# Patient Record
Sex: MALE | Race: WHITE | NOT HISPANIC OR LATINO | Employment: FULL TIME | URBAN - METROPOLITAN AREA
[De-identification: names, ages, dates, MRNs, and addresses within clinical notes are randomized per-mention and may not be internally consistent; named-entity substitution may affect disease eponyms.]

---

## 2023-02-15 PROBLEM — R97.20 ELEVATED PSA: Status: ACTIVE | Noted: 2023-02-15

## 2023-02-15 PROBLEM — I10 BENIGN ESSENTIAL HYPERTENSION: Status: ACTIVE | Noted: 2023-02-15

## 2023-02-15 PROBLEM — E78.5 HYPERLIPIDEMIA: Status: ACTIVE | Noted: 2023-02-15

## 2023-02-15 PROBLEM — R09.82 POST-NASAL DRIP: Status: ACTIVE | Noted: 2023-02-15

## 2023-02-15 PROBLEM — K21.9 GERD (GASTROESOPHAGEAL REFLUX DISEASE): Status: ACTIVE | Noted: 2023-02-15

## 2023-02-15 PROBLEM — I86.1 BILATERAL VARICOCELES: Status: ACTIVE | Noted: 2023-02-15

## 2023-02-15 PROBLEM — L23.9 CONTACT DERMATITIS, ALLERGIC: Status: ACTIVE | Noted: 2023-02-15

## 2023-02-15 PROBLEM — M25.50 POLYARTHRALGIA: Status: ACTIVE | Noted: 2023-02-15

## 2023-02-15 PROBLEM — E05.90 HYPERTHYROIDISM, SUBCLINICAL: Status: ACTIVE | Noted: 2023-02-15

## 2023-02-15 PROBLEM — E66.3 OVERWEIGHT WITH BODY MASS INDEX (BMI) OF 27 TO 27.9 IN ADULT: Status: ACTIVE | Noted: 2023-02-15

## 2023-02-15 PROBLEM — F41.9 MILD ANXIETY: Status: ACTIVE | Noted: 2023-02-15

## 2023-02-15 PROBLEM — R79.89 LOW VITAMIN D LEVEL: Status: ACTIVE | Noted: 2023-02-15

## 2023-02-15 PROBLEM — M79.89 RIGHT LEG SWELLING: Status: ACTIVE | Noted: 2023-02-15

## 2023-02-15 PROBLEM — N41.9 PROSTATITIS: Status: ACTIVE | Noted: 2023-02-15

## 2023-02-15 RX ORDER — BETAMETHASONE VALERATE 1.2 MG/G
OINTMENT TOPICAL 2 TIMES DAILY
COMMUNITY
Start: 2018-04-03 | End: 2024-03-26 | Stop reason: SDUPTHER

## 2023-02-15 RX ORDER — DOXYCYCLINE 100 MG/1
100 TABLET ORAL EVERY 12 HOURS
COMMUNITY
End: 2024-04-29 | Stop reason: WASHOUT

## 2023-02-15 RX ORDER — AZELASTINE 1 MG/ML
2 SPRAY, METERED NASAL EVERY EVENING
COMMUNITY
End: 2024-04-29 | Stop reason: WASHOUT

## 2023-02-15 RX ORDER — VERAPAMIL HYDROCHLORIDE 120 MG/1
120 CAPSULE, EXTENDED RELEASE ORAL DAILY
COMMUNITY
Start: 2019-03-20 | End: 2023-06-20 | Stop reason: SDUPTHER

## 2023-02-15 RX ORDER — ASPIRIN 81 MG/1
1 TABLET ORAL DAILY
COMMUNITY
End: 2023-09-11 | Stop reason: SDUPTHER

## 2023-02-15 RX ORDER — DIAZEPAM 10 MG/1
10 TABLET ORAL
COMMUNITY
End: 2024-04-29 | Stop reason: WASHOUT

## 2023-02-15 RX ORDER — LISINOPRIL AND HYDROCHLOROTHIAZIDE 12.5; 2 MG/1; MG/1
1 TABLET ORAL 2 TIMES DAILY
COMMUNITY
Start: 2018-11-01 | End: 2023-08-07

## 2023-02-15 RX ORDER — OMEPRAZOLE 40 MG/1
1 CAPSULE, DELAYED RELEASE ORAL DAILY
COMMUNITY
Start: 2014-02-17 | End: 2023-09-20 | Stop reason: SDUPTHER

## 2023-02-15 RX ORDER — CHOLECALCIFEROL (VITAMIN D3) 25 MCG
TABLET ORAL
COMMUNITY
End: 2024-04-29 | Stop reason: WASHOUT

## 2023-03-28 ENCOUNTER — OFFICE VISIT (OUTPATIENT)
Dept: PRIMARY CARE | Facility: CLINIC | Age: 63
End: 2023-03-28
Payer: COMMERCIAL

## 2023-03-28 VITALS
DIASTOLIC BLOOD PRESSURE: 74 MMHG | BODY MASS INDEX: 27.62 KG/M2 | HEART RATE: 67 BPM | WEIGHT: 176 LBS | OXYGEN SATURATION: 95 % | HEIGHT: 67 IN | SYSTOLIC BLOOD PRESSURE: 128 MMHG

## 2023-03-28 DIAGNOSIS — R79.89 LOW VITAMIN D LEVEL: ICD-10-CM

## 2023-03-28 DIAGNOSIS — R10.30 INGUINAL PAIN, UNSPECIFIED LATERALITY: Primary | ICD-10-CM

## 2023-03-28 DIAGNOSIS — I10 BENIGN ESSENTIAL HYPERTENSION: ICD-10-CM

## 2023-03-28 DIAGNOSIS — Z00.00 PREVENTATIVE HEALTH CARE: ICD-10-CM

## 2023-03-28 LAB
POC APPEARANCE, URINE: CLEAR
POC BILIRUBIN, URINE: NEGATIVE
POC BLOOD, URINE: NEGATIVE
POC COLOR, URINE: YELLOW
POC GLUCOSE, URINE: NEGATIVE MG/DL
POC KETONES, URINE: NEGATIVE MG/DL
POC LEUKOCYTES, URINE: NEGATIVE
POC NITRITE,URINE: NEGATIVE
POC PH, URINE: 7 PH
POC PROTEIN, URINE: NEGATIVE MG/DL
POC SPECIFIC GRAVITY, URINE: 1.02
POC UROBILINOGEN, URINE: 0.2 EU/DL

## 2023-03-28 PROCEDURE — 81003 URINALYSIS AUTO W/O SCOPE: CPT | Performed by: FAMILY MEDICINE

## 2023-03-28 PROCEDURE — 3078F DIAST BP <80 MM HG: CPT | Performed by: FAMILY MEDICINE

## 2023-03-28 PROCEDURE — 3074F SYST BP LT 130 MM HG: CPT | Performed by: FAMILY MEDICINE

## 2023-03-28 PROCEDURE — 99214 OFFICE O/P EST MOD 30 MIN: CPT | Performed by: FAMILY MEDICINE

## 2023-03-28 PROCEDURE — 1036F TOBACCO NON-USER: CPT | Performed by: FAMILY MEDICINE

## 2023-03-28 ASSESSMENT — PATIENT HEALTH QUESTIONNAIRE - PHQ9
SUM OF ALL RESPONSES TO PHQ9 QUESTIONS 1 AND 2: 2
2. FEELING DOWN, DEPRESSED OR HOPELESS: SEVERAL DAYS
10. IF YOU CHECKED OFF ANY PROBLEMS, HOW DIFFICULT HAVE THESE PROBLEMS MADE IT FOR YOU TO DO YOUR WORK, TAKE CARE OF THINGS AT HOME, OR GET ALONG WITH OTHER PEOPLE: NOT DIFFICULT AT ALL
1. LITTLE INTEREST OR PLEASURE IN DOING THINGS: SEVERAL DAYS

## 2023-03-28 NOTE — PROGRESS NOTES
"Subjective   Patient ID: Foreign White is a 63 y.o. male who presents for Follow-up (6 month follow up, would like to discuss results on prostate ).    HPI     #) getting work up for hyperthyroidism   - elevated BP -- much better today   - work up with Dr Ham     #) leg swelling - doing pretty good - got better for now     #) checking on spot on the eye on the eye ball, described as a blood spot on the whites of the eye -- RESOLVED with drops   - BP today was 133/80 --> 159/85-->  128/74 today   - no eye pain   - no change in vision  - was seen by eye doc and given eye drop   - family eye care in [place],     #) BPH with a varicocele - stable- q 6 month PSA and MRI yearly.   - Follow with Dr White, urology -- updated MRI, to be reviewed 4/5/23  - had a biopsy- PSA stable and MRI shows no evidence of cancer. We will repeat PSA every 6 months and MRI annually.  - some urinary changes     #) allergies - stable   - flonase as needed.     #) HTN with HLD - stable , controlled   - on lisinorpil- HCTZ and verapamil     #) Anxiety- COVID has stressed him out- still mostly work related   - feels like he is doing pretty good at the moment   - sleep is reported as ok  was stuck on a ride at [place] - harness was too ticht and had a panic attack   MRI phobia prn alprazolam with imaging     #) GERD - on PPI - STABLE   - acid seems to make it work   - EGD and cscope 2/15/19- repeat cscope every 5 years     #) Rash- topical irritants from work   - follows with [facility]   - Mohs placed for leg lesion     Review of Systems    Objective   /74   Pulse 67   Ht 1.702 m (5' 7\")   Wt 79.8 kg (176 lb)   SpO2 95%   BMI 27.57 kg/m²     Physical Exam  Vitals reviewed.   Constitutional:       General: He is not in acute distress.     Appearance: He is not toxic-appearing.   HENT:      Head: Normocephalic and atraumatic.      Right Ear: Tympanic membrane and ear canal normal. There is no impacted cerumen.      Left " Ear: Tympanic membrane and ear canal normal. There is no impacted cerumen.      Nose: No congestion or rhinorrhea.      Mouth/Throat:      Mouth: Mucous membranes are moist.      Pharynx: No oropharyngeal exudate or posterior oropharyngeal erythema.   Eyes:      Extraocular Movements: Extraocular movements intact.      Conjunctiva/sclera: Conjunctivae normal.      Pupils: Pupils are equal, round, and reactive to light.   Cardiovascular:      Rate and Rhythm: Normal rate and regular rhythm.      Heart sounds: Normal heart sounds. No murmur heard.  Pulmonary:      Effort: No respiratory distress.      Breath sounds: No wheezing.   Abdominal:      General: Bowel sounds are normal.      Palpations: Abdomen is soft.      Tenderness: There is no abdominal tenderness.   Musculoskeletal:         General: No deformity. Normal range of motion.      Cervical back: Neck supple. No tenderness.      Right lower leg: No edema.      Left lower leg: No edema.   Lymphadenopathy:      Cervical: No cervical adenopathy.   Skin:     Findings: No bruising or rash.   Neurological:      Mental Status: He is alert.      Cranial Nerves: No cranial nerve deficit.      Motor: No weakness.      Gait: Gait normal.   Psychiatric:         Mood and Affect: Mood normal.         Assessment/Plan   Problem List Items Addressed This Visit          Circulatory    Benign essential hypertension       Other    Low vitamin D level    Relevant Orders    Vitamin D 25-Hydroxy,Total     Other Visit Diagnoses       Inguinal pain, unspecified laterality    -  Primary    Relevant Orders    POCT UA Automated manually resulted    Preventative health care        Relevant Orders    CBC and Auto Differential    Comprehensive metabolic panel    Lipid panel          Please continue to monitor your Blood pressure at home.  Looks great today.     EGD and cscope 2/15/19- repeat cscope every 5 years (February 2024)     continue yearly follow up with Dr Owusu for prostate  monitoring    stress test looked good in January 2019.     Follow up with Dr Ham for the results of the thyroid testing.    Please also continue follow up with [facility] yearly.     Please get updated fasting blood work in June 2023, orders given.     We will check urine today for infection.     Follow up in 6 months for a Physical Examination  or sooner as needed.

## 2023-03-28 NOTE — PATIENT INSTRUCTIONS
Please continue to monitor your Blood pressure at home.  Looks great today.     EGD and cscope 2/15/19- repeat cscope every 5 years (February 2024)     continue yearly follow up with Dr Owusu for prostate monitoring    stress test looked good in January 2019.     Follow up with Dr Ham for the results of the thyroid testing.    Please also continue follow up with [facility] yearly.     Please get updated fasting blood work in June 2023, orders given.     We will check urine today for infection.     Follow up in 6 months for a Physical Examination  or sooner as needed.

## 2023-04-12 LAB
RBC, URINE: <1 /HPF (ref 0–5)
WBC, URINE: 1 /HPF (ref 0–5)

## 2023-04-13 LAB — URINE CULTURE: NO GROWTH

## 2023-04-14 LAB
CHLAMYDIA TRACH., AMPLIFIED: NEGATIVE
N. GONORRHEA, AMPLIFIED: NEGATIVE

## 2023-04-18 LAB — UREAPLASMA/MYCOPLASMA CULTURE: NORMAL

## 2023-04-20 ENCOUNTER — LAB (OUTPATIENT)
Dept: LAB | Facility: LAB | Age: 63
End: 2023-04-20
Payer: COMMERCIAL

## 2023-04-20 DIAGNOSIS — Z00.00 PREVENTATIVE HEALTH CARE: ICD-10-CM

## 2023-04-20 DIAGNOSIS — R79.89 LOW VITAMIN D LEVEL: ICD-10-CM

## 2023-04-20 LAB
ALANINE AMINOTRANSFERASE (SGPT) (U/L) IN SER/PLAS: 15 U/L (ref 10–52)
ALBUMIN (G/DL) IN SER/PLAS: 4.3 G/DL (ref 3.4–5)
ALKALINE PHOSPHATASE (U/L) IN SER/PLAS: 33 U/L (ref 33–136)
ANION GAP IN SER/PLAS: 11 MMOL/L (ref 10–20)
ASPARTATE AMINOTRANSFERASE (SGOT) (U/L) IN SER/PLAS: 17 U/L (ref 9–39)
BASOPHILS (10*3/UL) IN BLOOD BY AUTOMATED COUNT: 0.07 X10E9/L (ref 0–0.1)
BASOPHILS/100 LEUKOCYTES IN BLOOD BY AUTOMATED COUNT: 1 % (ref 0–2)
BILIRUBIN TOTAL (MG/DL) IN SER/PLAS: 0.9 MG/DL (ref 0–1.2)
CALCIDIOL (25 OH VITAMIN D3) (NG/ML) IN SER/PLAS: 78 NG/ML
CALCIUM (MG/DL) IN SER/PLAS: 9 MG/DL (ref 8.6–10.3)
CARBON DIOXIDE, TOTAL (MMOL/L) IN SER/PLAS: 31 MMOL/L (ref 21–32)
CHLORIDE (MMOL/L) IN SER/PLAS: 103 MMOL/L (ref 98–107)
CHOLESTEROL (MG/DL) IN SER/PLAS: 149 MG/DL (ref 0–199)
CHOLESTEROL IN HDL (MG/DL) IN SER/PLAS: 40.2 MG/DL
CHOLESTEROL/HDL RATIO: 3.7
CREATININE (MG/DL) IN SER/PLAS: 0.99 MG/DL (ref 0.5–1.3)
EOSINOPHILS (10*3/UL) IN BLOOD BY AUTOMATED COUNT: 0.17 X10E9/L (ref 0–0.7)
EOSINOPHILS/100 LEUKOCYTES IN BLOOD BY AUTOMATED COUNT: 2.4 % (ref 0–6)
ERYTHROCYTE DISTRIBUTION WIDTH (RATIO) BY AUTOMATED COUNT: 13.2 % (ref 11.5–14.5)
ERYTHROCYTE MEAN CORPUSCULAR HEMOGLOBIN CONCENTRATION (G/DL) BY AUTOMATED: 32.5 G/DL (ref 32–36)
ERYTHROCYTE MEAN CORPUSCULAR VOLUME (FL) BY AUTOMATED COUNT: 89 FL (ref 80–100)
ERYTHROCYTES (10*6/UL) IN BLOOD BY AUTOMATED COUNT: 5.34 X10E12/L (ref 4.5–5.9)
GFR MALE: 85 ML/MIN/1.73M2
GLUCOSE (MG/DL) IN SER/PLAS: 94 MG/DL (ref 74–99)
HEMATOCRIT (%) IN BLOOD BY AUTOMATED COUNT: 47.4 % (ref 41–52)
HEMOGLOBIN (G/DL) IN BLOOD: 15.4 G/DL (ref 13.5–17.5)
IMMATURE GRANULOCYTES/100 LEUKOCYTES IN BLOOD BY AUTOMATED COUNT: 0.1 % (ref 0–0.9)
LDL: 87 MG/DL (ref 0–99)
LEUKOCYTES (10*3/UL) IN BLOOD BY AUTOMATED COUNT: 7 X10E9/L (ref 4.4–11.3)
LYMPHOCYTES (10*3/UL) IN BLOOD BY AUTOMATED COUNT: 3.17 X10E9/L (ref 1.2–4.8)
LYMPHOCYTES/100 LEUKOCYTES IN BLOOD BY AUTOMATED COUNT: 45.3 % (ref 13–44)
MONOCYTES (10*3/UL) IN BLOOD BY AUTOMATED COUNT: 0.86 X10E9/L (ref 0.1–1)
MONOCYTES/100 LEUKOCYTES IN BLOOD BY AUTOMATED COUNT: 12.3 % (ref 2–10)
NEUTROPHILS (10*3/UL) IN BLOOD BY AUTOMATED COUNT: 2.72 X10E9/L (ref 1.2–7.7)
NEUTROPHILS/100 LEUKOCYTES IN BLOOD BY AUTOMATED COUNT: 38.9 % (ref 40–80)
PLATELETS (10*3/UL) IN BLOOD AUTOMATED COUNT: 314 X10E9/L (ref 150–450)
POTASSIUM (MMOL/L) IN SER/PLAS: 4.3 MMOL/L (ref 3.5–5.3)
PROTEIN TOTAL: 6.2 G/DL (ref 6.4–8.2)
SODIUM (MMOL/L) IN SER/PLAS: 141 MMOL/L (ref 136–145)
THYROTROPIN (MIU/L) IN SER/PLAS BY DETECTION LIMIT <= 0.05 MIU/L: 0.29 MIU/L (ref 0.44–3.98)
THYROXINE (T4) FREE (NG/DL) IN SER/PLAS: 1.34 NG/DL (ref 0.61–1.12)
TRIGLYCERIDE (MG/DL) IN SER/PLAS: 107 MG/DL (ref 0–149)
UREA NITROGEN (MG/DL) IN SER/PLAS: 19 MG/DL (ref 6–23)
VLDL: 21 MG/DL (ref 0–40)

## 2023-04-20 PROCEDURE — 82306 VITAMIN D 25 HYDROXY: CPT

## 2023-04-20 PROCEDURE — 80061 LIPID PANEL: CPT

## 2023-04-20 PROCEDURE — 85025 COMPLETE CBC W/AUTO DIFF WBC: CPT

## 2023-04-20 PROCEDURE — 80053 COMPREHEN METABOLIC PANEL: CPT

## 2023-04-20 PROCEDURE — 36415 COLL VENOUS BLD VENIPUNCTURE: CPT

## 2023-05-04 ENCOUNTER — APPOINTMENT (OUTPATIENT)
Dept: LAB | Facility: LAB | Age: 63
End: 2023-05-04
Payer: COMMERCIAL

## 2023-05-04 LAB
ANION GAP IN SER/PLAS: 10 MMOL/L (ref 10–20)
CALCIUM (MG/DL) IN SER/PLAS: 9.3 MG/DL (ref 8.6–10.3)
CARBON DIOXIDE, TOTAL (MMOL/L) IN SER/PLAS: 30 MMOL/L (ref 21–32)
CHLORIDE (MMOL/L) IN SER/PLAS: 103 MMOL/L (ref 98–107)
CREATININE (MG/DL) IN SER/PLAS: 1.03 MG/DL (ref 0.5–1.3)
ERYTHROCYTE DISTRIBUTION WIDTH (RATIO) BY AUTOMATED COUNT: 13.4 % (ref 11.5–14.5)
ERYTHROCYTE MEAN CORPUSCULAR HEMOGLOBIN CONCENTRATION (G/DL) BY AUTOMATED: 32 G/DL (ref 32–36)
ERYTHROCYTE MEAN CORPUSCULAR VOLUME (FL) BY AUTOMATED COUNT: 90 FL (ref 80–100)
ERYTHROCYTES (10*6/UL) IN BLOOD BY AUTOMATED COUNT: 5.26 X10E12/L (ref 4.5–5.9)
GFR MALE: 81 ML/MIN/1.73M2
GLUCOSE (MG/DL) IN SER/PLAS: 101 MG/DL (ref 74–99)
HEMATOCRIT (%) IN BLOOD BY AUTOMATED COUNT: 47.5 % (ref 41–52)
HEMOGLOBIN (G/DL) IN BLOOD: 15.2 G/DL (ref 13.5–17.5)
INR IN PPP BY COAGULATION ASSAY: 1 (ref 0.9–1.1)
LEUKOCYTES (10*3/UL) IN BLOOD BY AUTOMATED COUNT: 7.6 X10E9/L (ref 4.4–11.3)
PLATELETS (10*3/UL) IN BLOOD AUTOMATED COUNT: 312 X10E9/L (ref 150–450)
POTASSIUM (MMOL/L) IN SER/PLAS: 3.6 MMOL/L (ref 3.5–5.3)
PROTHROMBIN TIME (PT) IN PPP BY COAGULATION ASSAY: 11.5 SEC (ref 9.8–13.4)
SODIUM (MMOL/L) IN SER/PLAS: 139 MMOL/L (ref 136–145)
UREA NITROGEN (MG/DL) IN SER/PLAS: 17 MG/DL (ref 6–23)

## 2023-05-05 LAB — URINE CULTURE: NO GROWTH

## 2023-06-20 ENCOUNTER — TELEPHONE (OUTPATIENT)
Dept: PRIMARY CARE | Facility: CLINIC | Age: 63
End: 2023-06-20
Payer: COMMERCIAL

## 2023-06-20 DIAGNOSIS — I10 BENIGN ESSENTIAL HYPERTENSION: ICD-10-CM

## 2023-06-20 NOTE — TELEPHONE ENCOUNTER
Pt called for a medication refill request for  Verapamil  To go to the [facility] in [place].     LOV-03/28/23 NOV-09/26/23

## 2023-06-21 RX ORDER — VERAPAMIL HYDROCHLORIDE 120 MG/1
120 CAPSULE, EXTENDED RELEASE ORAL DAILY
Qty: 90 CAPSULE | Refills: 3 | Status: SHIPPED | OUTPATIENT
Start: 2023-06-21 | End: 2024-03-28 | Stop reason: SDUPTHER

## 2023-08-06 DIAGNOSIS — I10 ESSENTIAL (PRIMARY) HYPERTENSION: ICD-10-CM

## 2023-08-07 RX ORDER — LISINOPRIL AND HYDROCHLOROTHIAZIDE 12.5; 2 MG/1; MG/1
1 TABLET ORAL 2 TIMES DAILY
Qty: 180 TABLET | Refills: 3 | Status: SHIPPED | OUTPATIENT
Start: 2023-08-07

## 2023-09-11 PROBLEM — C61 PROSTATE CANCER (MULTI): Status: ACTIVE | Noted: 2023-09-11

## 2023-09-11 PROBLEM — G93.31 POST VIRAL SYNDROME: Status: ACTIVE | Noted: 2023-09-11

## 2023-09-11 PROBLEM — H93.13 TINNITUS, BILATERAL: Status: ACTIVE | Noted: 2023-09-11

## 2023-09-11 PROBLEM — R10.2 PERINEAL PAIN IN MALE: Status: ACTIVE | Noted: 2023-09-11

## 2023-09-11 PROBLEM — H90.3 BILATERAL SENSORINEURAL HEARING LOSS: Status: ACTIVE | Noted: 2023-09-11

## 2023-09-11 RX ORDER — LISINOPRIL 20 MG/1
20 TABLET ORAL
COMMUNITY
End: 2023-10-16 | Stop reason: SDUPTHER

## 2023-09-11 RX ORDER — ACETAMINOPHEN, DEXTROMETHORPHAN HBR, DOXYLAMINE SUCCINATE, PHENYLEPHRINE HCL 650; 20; 12.5; 1 MG/30ML; MG/30ML; MG/30ML; MG/30ML
SOLUTION ORAL
COMMUNITY

## 2023-09-11 RX ORDER — ASPIRIN 81 MG/1
1 TABLET ORAL DAILY
COMMUNITY

## 2023-09-11 RX ORDER — METHYLPREDNISOLONE 4 MG/1
TABLET ORAL
COMMUNITY
Start: 2023-01-23 | End: 2024-04-29 | Stop reason: WASHOUT

## 2023-09-20 DIAGNOSIS — K21.9 GASTROESOPHAGEAL REFLUX DISEASE WITHOUT ESOPHAGITIS: ICD-10-CM

## 2023-09-20 RX ORDER — OMEPRAZOLE 40 MG/1
40 CAPSULE, DELAYED RELEASE ORAL
Qty: 90 CAPSULE | Refills: 3 | Status: SHIPPED | OUTPATIENT
Start: 2023-09-20

## 2023-09-23 ASSESSMENT — PROMIS GLOBAL HEALTH SCALE
RATE_QUALITY_OF_LIFE: VERY GOOD
RATE_SOCIAL_SATISFACTION: VERY GOOD
RATE_AVERAGE_PAIN: 3
EMOTIONAL_PROBLEMS: SOMETIMES
RATE_GENERAL_HEALTH: VERY GOOD
RATE_PHYSICAL_HEALTH: VERY GOOD
CARRYOUT_PHYSICAL_ACTIVITIES: COMPLETELY
RATE_AVERAGE_FATIGUE: MILD
RATE_MENTAL_HEALTH: VERY GOOD
CARRYOUT_SOCIAL_ACTIVITIES: VERY GOOD

## 2023-09-26 ENCOUNTER — OFFICE VISIT (OUTPATIENT)
Dept: PRIMARY CARE | Facility: CLINIC | Age: 63
End: 2023-09-26
Payer: COMMERCIAL

## 2023-09-26 VITALS
BODY MASS INDEX: 25.7 KG/M2 | SYSTOLIC BLOOD PRESSURE: 122 MMHG | DIASTOLIC BLOOD PRESSURE: 60 MMHG | OXYGEN SATURATION: 98 % | WEIGHT: 169 LBS | HEART RATE: 72 BPM

## 2023-09-26 DIAGNOSIS — C61 PROSTATE CANCER (MULTI): ICD-10-CM

## 2023-09-26 DIAGNOSIS — E78.2 MIXED HYPERLIPIDEMIA: ICD-10-CM

## 2023-09-26 DIAGNOSIS — I10 BENIGN ESSENTIAL HYPERTENSION: Primary | ICD-10-CM

## 2023-09-26 PROCEDURE — 1036F TOBACCO NON-USER: CPT | Performed by: FAMILY MEDICINE

## 2023-09-26 PROCEDURE — 99396 PREV VISIT EST AGE 40-64: CPT | Performed by: FAMILY MEDICINE

## 2023-09-26 PROCEDURE — 3078F DIAST BP <80 MM HG: CPT | Performed by: FAMILY MEDICINE

## 2023-09-26 PROCEDURE — 3074F SYST BP LT 130 MM HG: CPT | Performed by: FAMILY MEDICINE

## 2023-09-26 ASSESSMENT — PATIENT HEALTH QUESTIONNAIRE - PHQ9
SUM OF ALL RESPONSES TO PHQ9 QUESTIONS 1 AND 2: 0
2. FEELING DOWN, DEPRESSED OR HOPELESS: NOT AT ALL
1. LITTLE INTEREST OR PLEASURE IN DOING THINGS: NOT AT ALL

## 2023-09-26 NOTE — PROGRESS NOTES
Subjective   Patient is a 63 y.o. male presents for yearly physical examination.     #) getting work up for hyperthyroidism    - work up with Dr Ham     #) leg swelling - doing pretty good - got better for now     #) checking on spot on the eye on the eye ball, described as a blood spot on the whites of the eye -- RESOLVED with drops   - family eye care in [place],     #) BPH with a varicocele - stable- q 6 month PSA and MRI yearly.   - Follow with Dr Joshua, urology -- updated MRI, to be reviewed 4/5/23  - had a biopsy- PSA stable and MRI shows no evidence of cancer. We will repeat PSA every 6 months and MRI annually.  - some urinary changes     #) allergies - stable   - flonase as needed.     #) HTN with HLD - stable , controlled   - 122/60 today   - on lisinorpil- HCTZ and verapamil     #) Anxiety- COVID has stressed him out- still mostly work related   - feels like he is doing pretty good at the moment   - sleep is reported as ok  was stuck on a ride at [place] [employer] - harness was too ticht and had a panic attack   MRI phobia prn alprazolam with imaging     #) GERD - on PPI - STABLE   - acid seems to make it work   - EGD and cscope 2/15/19- repeat cscope every 5 years     #) Rash- topical irritants from work   - follows with [facility]   - Mohs placed for leg lesion     Review of system are negative unless otherwise stated in the HPI.     Objective     /60   Pulse 72   Wt 76.7 kg (169 lb)   SpO2 98%   BMI 25.70 kg/m²     Physical Examination  GEN: A+O, no acute distress  HEENT: NC/AT, Oropharynx clear, no exudates, TM visualized, Extraoccular muscles intact, no facial droop; no thyromegaly or cervical LAD  RESP: CTAB, no wheezes   CV: RRR, no murmurs  ABD: soft, non-tender, + BS  SKIN: no rashes or bruising, no peripheral edema   NEURO: CN II-XII grossly intact, moves all extremities equally, no tremor   PSYCH: normal affect, appropriate mood     Assessment/Plan     Blood pressure looks  great today at 122/60.     Call if you need any medication refills.     EGD and cscope 2/15/19- repeat cscope every 5 years (February 2024) - please reach out to your gastroenterologist to schedule this.     Continue yearly follow up with Dr Jarvis for the prostate monitoring     Continue the daily exercise program.      stress test looked good in January 2019.     Follow up with Dr Ham for the results of the thyroid testing and monitoring.     Please also continue follow up with [facility] yearly as recommended     You are due for repeat fasting blood work in April 2024. Orders given today (continue labs testing for your urologist and endocrinologist).    Follow up in 6 months for a Welcome to Medicate Examination  or sooner as needed.

## 2023-09-26 NOTE — PATIENT INSTRUCTIONS
Blood pressure looks great today at 122/60.     Call if you need any medication refills.     EGD and cscope 2/15/19- repeat cscope every 5 years (February 2024) - please reach out to your gastroenterologist to schedule this.     Continue yearly follow up with Dr Jarvis for the prostate monitoring     Continue the daily exercise program.      stress test looked good in January 2019.     Follow up with Dr Ham for the results of the thyroid testing and monitoring.     Please also continue follow up with [facility] yearly as recommended     You are due for repeat fasting blood work in April 2024. Orders given today (continue labs testing for your urologist and endocrinologist).    Follow up in 6 months for a Welcome to Medicate Examination  or sooner as needed.

## 2023-10-16 ENCOUNTER — LAB (OUTPATIENT)
Dept: LAB | Facility: LAB | Age: 63
End: 2023-10-16
Payer: COMMERCIAL

## 2023-10-16 ENCOUNTER — OFFICE VISIT (OUTPATIENT)
Dept: ENDOCRINOLOGY | Facility: CLINIC | Age: 63
End: 2023-10-16
Payer: COMMERCIAL

## 2023-10-16 VITALS
DIASTOLIC BLOOD PRESSURE: 81 MMHG | HEIGHT: 68 IN | BODY MASS INDEX: 25.31 KG/M2 | WEIGHT: 167 LBS | SYSTOLIC BLOOD PRESSURE: 155 MMHG

## 2023-10-16 DIAGNOSIS — E05.90 HYPERTHYROIDISM, SUBCLINICAL: Primary | ICD-10-CM

## 2023-10-16 DIAGNOSIS — E05.90 HYPERTHYROIDISM, SUBCLINICAL: ICD-10-CM

## 2023-10-16 LAB
T4 FREE SERPL-MCNC: 1.11 NG/DL (ref 0.61–1.12)
TSH SERPL-ACNC: 0.22 MIU/L (ref 0.44–3.98)

## 2023-10-16 PROCEDURE — 36415 COLL VENOUS BLD VENIPUNCTURE: CPT

## 2023-10-16 PROCEDURE — 84443 ASSAY THYROID STIM HORMONE: CPT

## 2023-10-16 PROCEDURE — 1036F TOBACCO NON-USER: CPT | Performed by: INTERNAL MEDICINE

## 2023-10-16 PROCEDURE — 3079F DIAST BP 80-89 MM HG: CPT | Performed by: INTERNAL MEDICINE

## 2023-10-16 PROCEDURE — 3077F SYST BP >= 140 MM HG: CPT | Performed by: INTERNAL MEDICINE

## 2023-10-16 PROCEDURE — 84439 ASSAY OF FREE THYROXINE: CPT

## 2023-10-16 PROCEDURE — 99213 OFFICE O/P EST LOW 20 MIN: CPT | Performed by: INTERNAL MEDICINE

## 2023-10-16 NOTE — LETTER
"October 16, 2023     Umu Sroensen DO  [address]  [address]  [facility] [address]    Patient: Foreign White   YOB: 1960   Date of Visit: 10/16/2023       Dear Dr. Umu Sorensen DO:    Thank you for referring Foreign White to me for evaluation. Below are my notes for this consultation.  If you have questions, please do not hesitate to call me. I look forward to following your patient along with you.       Sincerely,     Vladimir Ham MD      CC: No Recipients  ______________________________________________________________________________________    Subjective  Foreign White is a 63 y.o. male with subclinical thyrotoxicosis    No palpitations or tremors  Some intentional weight loss, walking    Objective  /81 (BP Location: Left arm, Patient Position: Sitting)   Ht 1.727 m (5' 8\")   Wt 75.8 kg (167 lb)   BMI 25.39 kg/m²   Physical Exam  Constitutional:       General: He is not in acute distress.     Appearance: He is normal weight.   Neurological:      Mental Status: He is alert.          Lab Results   Component Value Date    TSH 0.29 (L) 04/20/2023    FREET4 1.34 (H) 04/20/2023       Assessment/Plan    SUBCLINICAL THYROTOXICOSIS  History of mild TSH suppression  No thyrotoxic symptoms      RECOMMENDATIONS  Draw TSH, free T4   Results on [employer]  Message or call if you have not received the results 3 days later.      "

## 2023-10-16 NOTE — PROGRESS NOTES
"Subjective   Foreign CHILDERS Carrizo Hill is a 63 y.o. male with subclinical thyrotoxicosis    No palpitations or tremors  Some intentional weight loss, walking    Objective   /81 (BP Location: Left arm, Patient Position: Sitting)   Ht 1.727 m (5' 8\")   Wt 75.8 kg (167 lb)   BMI 25.39 kg/m²   Physical Exam  Constitutional:       General: He is not in acute distress.     Appearance: He is normal weight.   Neurological:      Mental Status: He is alert.          Lab Results   Component Value Date    TSH 0.29 (L) 04/20/2023    FREET4 1.34 (H) 04/20/2023       Assessment/Plan     SUBCLINICAL THYROTOXICOSIS  History of mild TSH suppression  No thyrotoxic symptoms      RECOMMENDATIONS  Draw TSH, free T4   Results on [employer]  Message or call if you have not received the results 3 days later.    "

## 2023-10-16 NOTE — PATIENT INSTRUCTIONS
RECOMMENDATIONS  Draw TSH, free T4   Results on [employer]  Message or call if you have not received the results 3 days later.

## 2023-10-23 DIAGNOSIS — E05.90 HYPERTHYROIDISM, SUBCLINICAL: Primary | ICD-10-CM

## 2023-11-09 ENCOUNTER — LAB (OUTPATIENT)
Dept: LAB | Facility: LAB | Age: 63
End: 2023-11-09
Payer: COMMERCIAL

## 2023-11-09 DIAGNOSIS — C61 MALIGNANT NEOPLASM OF PROSTATE (MULTI): Primary | ICD-10-CM

## 2023-11-09 PROCEDURE — 36415 COLL VENOUS BLD VENIPUNCTURE: CPT

## 2023-11-09 PROCEDURE — 84153 ASSAY OF PSA TOTAL: CPT

## 2023-11-10 LAB — PSA SERPL-MCNC: 5.67 NG/ML

## 2024-03-19 ENCOUNTER — LAB (OUTPATIENT)
Dept: LAB | Facility: LAB | Age: 64
End: 2024-03-19
Payer: MEDICARE

## 2024-03-19 DIAGNOSIS — E78.2 MIXED HYPERLIPIDEMIA: ICD-10-CM

## 2024-03-19 DIAGNOSIS — I10 BENIGN ESSENTIAL HYPERTENSION: ICD-10-CM

## 2024-03-19 LAB
ALBUMIN SERPL BCP-MCNC: 4.2 G/DL (ref 3.4–5)
ALP SERPL-CCNC: 31 U/L (ref 33–136)
ALT SERPL W P-5'-P-CCNC: 16 U/L (ref 10–52)
ANION GAP SERPL CALC-SCNC: 20 MMOL/L (ref 10–20)
AST SERPL W P-5'-P-CCNC: 18 U/L (ref 9–39)
BASOPHILS # BLD AUTO: 0.06 X10*3/UL (ref 0–0.1)
BASOPHILS NFR BLD AUTO: 1 %
BILIRUB SERPL-MCNC: 0.8 MG/DL (ref 0–1.2)
BUN SERPL-MCNC: 17 MG/DL (ref 6–23)
CALCIUM SERPL-MCNC: 9.2 MG/DL (ref 8.6–10.3)
CHLORIDE SERPL-SCNC: 101 MMOL/L (ref 98–107)
CHOLEST SERPL-MCNC: 162 MG/DL (ref 0–199)
CHOLESTEROL/HDL RATIO: 3
CO2 SERPL-SCNC: 27 MMOL/L (ref 21–32)
CREAT SERPL-MCNC: 1.02 MG/DL (ref 0.5–1.3)
EGFRCR SERPLBLD CKD-EPI 2021: 82 ML/MIN/1.73M*2
EOSINOPHIL # BLD AUTO: 0.14 X10*3/UL (ref 0–0.7)
EOSINOPHIL NFR BLD AUTO: 2.3 %
ERYTHROCYTE [DISTWIDTH] IN BLOOD BY AUTOMATED COUNT: 14.4 % (ref 11.5–14.5)
GLUCOSE SERPL-MCNC: 101 MG/DL (ref 74–99)
HCT VFR BLD AUTO: 47.8 % (ref 41–52)
HDLC SERPL-MCNC: 54.1 MG/DL
HGB BLD-MCNC: 15.2 G/DL (ref 13.5–17.5)
IMM GRANULOCYTES # BLD AUTO: 0.02 X10*3/UL (ref 0–0.7)
IMM GRANULOCYTES NFR BLD AUTO: 0.3 % (ref 0–0.9)
LDLC SERPL CALC-MCNC: 95 MG/DL
LYMPHOCYTES # BLD AUTO: 2.23 X10*3/UL (ref 1.2–4.8)
LYMPHOCYTES NFR BLD AUTO: 36.1 %
MCH RBC QN AUTO: 28.8 PG (ref 26–34)
MCHC RBC AUTO-ENTMCNC: 31.8 G/DL (ref 32–36)
MCV RBC AUTO: 91 FL (ref 80–100)
MONOCYTES # BLD AUTO: 0.66 X10*3/UL (ref 0.1–1)
MONOCYTES NFR BLD AUTO: 10.7 %
NEUTROPHILS # BLD AUTO: 3.06 X10*3/UL (ref 1.2–7.7)
NEUTROPHILS NFR BLD AUTO: 49.6 %
NON HDL CHOLESTEROL: 108 MG/DL (ref 0–149)
NRBC BLD-RTO: 0 /100 WBCS (ref 0–0)
PLATELET # BLD AUTO: 290 X10*3/UL (ref 150–450)
POTASSIUM SERPL-SCNC: 4.7 MMOL/L (ref 3.5–5.3)
PROT SERPL-MCNC: 6.3 G/DL (ref 6.4–8.2)
RBC # BLD AUTO: 5.27 X10*6/UL (ref 4.5–5.9)
SODIUM SERPL-SCNC: 143 MMOL/L (ref 136–145)
TRIGL SERPL-MCNC: 63 MG/DL (ref 0–149)
VLDL: 13 MG/DL (ref 0–40)
WBC # BLD AUTO: 6.2 X10*3/UL (ref 4.4–11.3)

## 2024-03-19 PROCEDURE — 85025 COMPLETE CBC W/AUTO DIFF WBC: CPT

## 2024-03-19 PROCEDURE — 80053 COMPREHEN METABOLIC PANEL: CPT

## 2024-03-19 PROCEDURE — 36415 COLL VENOUS BLD VENIPUNCTURE: CPT

## 2024-03-19 PROCEDURE — 80061 LIPID PANEL: CPT

## 2024-03-26 ENCOUNTER — OFFICE VISIT (OUTPATIENT)
Dept: PRIMARY CARE | Facility: CLINIC | Age: 64
End: 2024-03-26
Payer: MEDICARE

## 2024-03-26 VITALS
HEART RATE: 65 BPM | DIASTOLIC BLOOD PRESSURE: 70 MMHG | BODY MASS INDEX: 25.85 KG/M2 | WEIGHT: 170 LBS | OXYGEN SATURATION: 97 % | SYSTOLIC BLOOD PRESSURE: 124 MMHG

## 2024-03-26 DIAGNOSIS — I10 BENIGN ESSENTIAL HYPERTENSION: Primary | ICD-10-CM

## 2024-03-26 DIAGNOSIS — Z87.891 FORMER SMOKER: ICD-10-CM

## 2024-03-26 DIAGNOSIS — L23.5 ALLERGIC DERMATITIS DUE TO OTHER CHEMICAL PRODUCT: ICD-10-CM

## 2024-03-26 PROCEDURE — 1160F RVW MEDS BY RX/DR IN RCRD: CPT | Performed by: FAMILY MEDICINE

## 2024-03-26 PROCEDURE — 1159F MED LIST DOCD IN RCRD: CPT | Performed by: FAMILY MEDICINE

## 2024-03-26 PROCEDURE — 99214 OFFICE O/P EST MOD 30 MIN: CPT | Performed by: FAMILY MEDICINE

## 2024-03-26 PROCEDURE — 1036F TOBACCO NON-USER: CPT | Performed by: FAMILY MEDICINE

## 2024-03-26 PROCEDURE — 3074F SYST BP LT 130 MM HG: CPT | Performed by: FAMILY MEDICINE

## 2024-03-26 PROCEDURE — 3078F DIAST BP <80 MM HG: CPT | Performed by: FAMILY MEDICINE

## 2024-03-26 RX ORDER — ACETAMINOPHEN 500 MG
TABLET ORAL DAILY
COMMUNITY

## 2024-03-26 RX ORDER — BETAMETHASONE VALERATE 1.2 MG/G
OINTMENT TOPICAL 2 TIMES DAILY
Qty: 45 G | Refills: 1 | Status: SHIPPED | OUTPATIENT
Start: 2024-03-26 | End: 2024-04-10

## 2024-03-26 ASSESSMENT — PATIENT HEALTH QUESTIONNAIRE - PHQ9
SUM OF ALL RESPONSES TO PHQ9 QUESTIONS 1 AND 2: 0
1. LITTLE INTEREST OR PLEASURE IN DOING THINGS: NOT AT ALL
2. FEELING DOWN, DEPRESSED OR HOPELESS: NOT AT ALL

## 2024-03-26 NOTE — PATIENT INSTRUCTIONS
Blood pressure looks great today at 124/70    Weight is stable at 170#     Labs on 3/19/24- Blood counts look good, no evidence of anemia or infection. Other than sugar being 2 points elevated, normal electrolytes, normal liver and kidney function, and your cholesterol is well controlled.     EGD and cscope 2/27/24-Repeat colonoscopy in 5 years for surveillance.     Continue yearly follow up with Dr Jarvis for the prostate monitoring , MRI is due in May 2024, also get the labs done prior to next visit.     Continue the daily exercise program.      stress test looked good in January 2019.     Follow up with Dr Ham for the results of the thyroid testing and monitoring. Get the labs done ahead of time, orders are in     If the hip/groin pains continue to get worse, we can order xrays and PT     We could consider an ultrasound to screen for AAA     Please also continue follow up with [facility] yearly as recommended     Follow up in 6 months for a Initial Medicare Wellness Examination  or sooner as needed.

## 2024-03-26 NOTE — PROGRESS NOTES
Subjective   Patient is a 64 y.o. male presents for 6 month follow up     #) getting work up for hyperthyroidism    - work up with Dr Ham     #) leg swelling - doing pretty good - got better for now     #) checking on spot on the eye on the eye ball, described as a blood spot on the whites of the eye -- RESOLVED with drops   - family eye care in [place],     #) BPH with a varicocele - stable- q 6 month PSA and MRI yearly.   - Follow with Dr Joshua, urology -- updated MRI, to be reviewed 4/5/23  - had a biopsy- PSA stable and MRI shows no evidence of cancer. We will repeat PSA every 6 months and MRI annually.  - some urinary changes     #) allergies - stable   - flonase as needed.     #) HTN with HLD - stable , controlled   - 122/60--> 124/70  today   - on lisinorpil- HCTZ and verapamil     #) Anxiety- COVID has stressed him out- still mostly work related   - father was really ill in January 2024, this was stressing him out   - Retired in Summer 2022  - feels like he is doing pretty good at the moment   - sleep is reported as ok  was stuck on a ride at [employer] - harness was too ticht and had a panic attack   MRI phobia prn alprazolam with imaging     #) GERD - on PPI - STABLE   - acid seems to make it work   - EGD 2/15/19  and cscope 2/27/24-  repeat cscope every 5 years     #) Rash- topical irritants from work   - follows with [facility]   - Mohs placed for leg lesion     Review of system are negative unless otherwise stated in the HPI.     Objective     /70   Pulse 65   Wt 77.1 kg (170 lb)   SpO2 97%   BMI 25.85 kg/m²     Physical Examination  GEN: A+O, no acute distress  HEENT: NC/AT, Oropharynx clear, no exudates, TM visualized, Extraoccular muscles intact, no facial droop; no thyromegaly or cervical LAD  RESP: CTAB, no wheezes   CV: RRR, no murmurs  ABD: soft, non-tender, + BS  SKIN: no rashes or bruising, no peripheral edema   NEURO: CN II-XII grossly intact, moves all extremities  equally, no tremor   PSYCH: normal affect, appropriate mood     Assessment/Plan     Blood pressure looks great today at 124/70    Weight is stable at 170#     Labs on 3/19/24- Blood counts look good, no evidence of anemia or infection. Other than sugar being 2 points elevated, normal electrolytes, normal liver and kidney function, and your cholesterol is well controlled.     EGD and cscope 2/27/24-Repeat colonoscopy in 5 years for surveillance.     Continue yearly follow up with Dr Jarvis for the prostate monitoring , MRI is due in May 2024, also get the labs done prior to next visit.     Continue the daily exercise program.      stress test looked good in January 2019.     Follow up with Dr Ham for the results of the thyroid testing and monitoring. Get the labs done ahead of time, orders are in     If the hip/groin pains continue to get worse, we can order xrays and PT     We could consider an ultrasound to screen for AAA     Please also continue follow up with [facility] yearly as recommended     Follow up in 6 months for a Initial Medicare Wellness Examination  or sooner as needed.

## 2024-03-28 DIAGNOSIS — I10 BENIGN ESSENTIAL HYPERTENSION: ICD-10-CM

## 2024-03-29 DIAGNOSIS — I10 BENIGN ESSENTIAL HYPERTENSION: Primary | ICD-10-CM

## 2024-03-29 DIAGNOSIS — I10 BENIGN ESSENTIAL HYPERTENSION: ICD-10-CM

## 2024-03-29 RX ORDER — VERAPAMIL HYDROCHLORIDE 120 MG/1
120 TABLET, FILM COATED, EXTENDED RELEASE ORAL 2 TIMES DAILY
Qty: 180 TABLET | Refills: 3 | Status: SHIPPED | OUTPATIENT
Start: 2024-03-29 | End: 2024-04-26 | Stop reason: WASHOUT

## 2024-03-29 RX ORDER — VERAPAMIL HYDROCHLORIDE 120 MG/1
120 CAPSULE, EXTENDED RELEASE ORAL DAILY
Qty: 90 CAPSULE | Refills: 3 | Status: SHIPPED | OUTPATIENT
Start: 2024-03-29 | End: 2024-03-29 | Stop reason: WASHOUT

## 2024-03-29 RX ORDER — VERAPAMIL HYDROCHLORIDE 120 MG/1
120 CAPSULE, EXTENDED RELEASE ORAL DAILY
Qty: 90 CAPSULE | Refills: 3 | OUTPATIENT
Start: 2024-03-29 | End: 2025-03-29

## 2024-03-29 NOTE — TELEPHONE ENCOUNTER
Sned in the tablet instead     PAST SURGICAL HISTORY:  H/O  section     S/P CABG (coronary artery bypass graft)

## 2024-04-23 ENCOUNTER — LAB (OUTPATIENT)
Dept: LAB | Facility: LAB | Age: 64
End: 2024-04-23
Payer: MEDICARE

## 2024-04-23 DIAGNOSIS — E05.90 HYPERTHYROIDISM, SUBCLINICAL: ICD-10-CM

## 2024-04-23 LAB
T4 FREE SERPL-MCNC: 1.02 NG/DL (ref 0.61–1.12)
TSH SERPL-ACNC: 0.43 MIU/L (ref 0.44–3.98)

## 2024-04-23 PROCEDURE — 84439 ASSAY OF FREE THYROXINE: CPT

## 2024-04-23 PROCEDURE — 84443 ASSAY THYROID STIM HORMONE: CPT

## 2024-04-23 PROCEDURE — 36415 COLL VENOUS BLD VENIPUNCTURE: CPT

## 2024-04-25 ENCOUNTER — TELEPHONE (OUTPATIENT)
Dept: PRIMARY CARE | Facility: CLINIC | Age: 64
End: 2024-04-25
Payer: COMMERCIAL

## 2024-04-26 DIAGNOSIS — Z87.891 FORMER SMOKER: Primary | ICD-10-CM

## 2024-04-26 DIAGNOSIS — I10 BENIGN ESSENTIAL HYPERTENSION: ICD-10-CM

## 2024-04-26 RX ORDER — VERAPAMIL HYDROCHLORIDE 120 MG/1
120 CAPSULE, EXTENDED RELEASE ORAL NIGHTLY
Qty: 90 CAPSULE | Refills: 3 | Status: SHIPPED | OUTPATIENT
Start: 2024-04-26 | End: 2025-04-26

## 2024-04-29 ENCOUNTER — OFFICE VISIT (OUTPATIENT)
Dept: ENDOCRINOLOGY | Facility: CLINIC | Age: 64
End: 2024-04-29
Payer: MEDICARE

## 2024-04-29 VITALS
BODY MASS INDEX: 25.39 KG/M2 | SYSTOLIC BLOOD PRESSURE: 136 MMHG | DIASTOLIC BLOOD PRESSURE: 80 MMHG | WEIGHT: 167 LBS | HEART RATE: 67 BPM

## 2024-04-29 DIAGNOSIS — E05.90 HYPERTHYROIDISM, SUBCLINICAL: Primary | ICD-10-CM

## 2024-04-29 PROCEDURE — 1036F TOBACCO NON-USER: CPT | Performed by: INTERNAL MEDICINE

## 2024-04-29 PROCEDURE — 3075F SYST BP GE 130 - 139MM HG: CPT | Performed by: INTERNAL MEDICINE

## 2024-04-29 PROCEDURE — 99213 OFFICE O/P EST LOW 20 MIN: CPT | Performed by: INTERNAL MEDICINE

## 2024-04-29 PROCEDURE — 3079F DIAST BP 80-89 MM HG: CPT | Performed by: INTERNAL MEDICINE

## 2024-04-29 PROCEDURE — 1159F MED LIST DOCD IN RCRD: CPT | Performed by: INTERNAL MEDICINE

## 2024-04-29 PROCEDURE — 1160F RVW MEDS BY RX/DR IN RCRD: CPT | Performed by: INTERNAL MEDICINE

## 2024-04-29 ASSESSMENT — ENCOUNTER SYMPTOMS
FEVER: 0
ABDOMINAL PAIN: 0
NERVOUS/ANXIOUS: 0
NECK PAIN: 0
NAUSEA: 0
SHORTNESS OF BREATH: 0
FATIGUE: 0
DIARRHEA: 0
UNEXPECTED WEIGHT CHANGE: 0
HEADACHES: 0
TROUBLE SWALLOWING: 0
CONSTIPATION: 0
TREMORS: 0
PALPITATIONS: 0
WEAKNESS: 0
VOMITING: 0

## 2024-04-29 NOTE — LETTER
"April 29, 2024     Umu Sorensen DO  [address]  [address]  [facility] [address]    Patient: Foreign White   YOB: 1960   Date of Visit: 4/29/2024       Dear Dr. Umu Sorensen DO:    Thank you for referring Foreign White to me for evaluation. Below are my notes for this consultation.  If you have questions, please do not hesitate to call me. I look forward to following your patient along with you.       Sincerely,     Vladimir Ham MD      CC: No Recipients  ______________________________________________________________________________________    History Of Present Illness  Johny White \"Foreign\" is a 64 y.o. male     History of subclinical thyrotoxicosis    Recent heartburn on verapamil tablets, resolved with change to verapamil capsules      Past Medical History  He has a past medical history of Personal history of colonic polyps, Personal history of other diseases of the digestive system, Personal history of other diseases of the respiratory system, and Personal history of other endocrine, nutritional and metabolic disease (06/24/2022).    Surgical History  He has a past surgical history that includes Tonsillectomy (02/17/2014) and Esophagogastroduodenoscopy (02/17/2014).     Social History  He reports that he quit smoking about 25 years ago. His smoking use included cigarettes. He has never used smokeless tobacco. He reports that he does not currently use alcohol. He reports that he does not use drugs.    Family History  Family History   Problem Relation Name Age of Onset   • Other (cardiac disorder) Mother     • Diabetes Mother     • Hyperlipidemia Mother     • Hypertension Mother     • Skin cancer Father     • Sleep apnea Father         Allergies  Amoxicillin; Latex, natural rubber; and Oxycodone-acetaminophen    Medications  Current Outpatient Medications   Medication Instructions   • aspirin (Adult Low Dose Aspirin) 81 mg EC tablet 1 tablet, oral, Daily   • cholecalciferol (Vitamin D3) 5,000 " Units tablet oral, Daily   • cyanocobalamin, vitamin B-12, (Vitamin B-12) 1,000 mcg tablet extended release oral   • lisinopriL-hydrochlorothiazide 20-12.5 mg tablet 1 tablet, oral, 2 times daily   • omeprazole (PRILOSEC) 40 mg, oral, Daily before breakfast   • verapamil ER (VERELAN) 120 mg, oral, Nightly, Do not crush or chew.       Review of Systems   Constitutional:  Negative for fatigue, fever and unexpected weight change.   HENT:  Negative for trouble swallowing.    Eyes:  Negative for visual disturbance.   Respiratory:  Negative for shortness of breath.    Cardiovascular:  Negative for chest pain and palpitations.   Gastrointestinal:  Negative for abdominal pain, constipation, diarrhea, nausea and vomiting.   Endocrine: Negative for cold intolerance and heat intolerance.   Musculoskeletal:  Negative for neck pain.   Skin:  Negative for rash.   Neurological:  Negative for tremors, weakness and headaches.   Psychiatric/Behavioral:  The patient is not nervous/anxious.          Last Recorded Vitals  Blood pressure 136/80, pulse 67, weight 75.8 kg (167 lb).    Physical Exam  Constitutional:       General: He is not in acute distress.  HENT:      Head: Normocephalic.   Neurological:      Mental Status: He is alert.   Psychiatric:         Mood and Affect: Affect normal.          Relevant Results  Lab Results   Component Value Date    TSH 0.43 (L) 04/23/2024    FREET4 1.02 04/23/2024    T3FREE 3.7 09/22/2022    RECE <1.10 09/22/2022       IMPRESSION  SUBCLINICAL THYROTOXICOSIS  Resolved  TSH very close to normal  Free T4 normal   No thyrotoxic symptoms      RECOMMENDATIONS  Follow up 1 year  Repeat labs before next appointment    Call if you have new symptoms of hyperthyroidism:  heart racing, palpitations, tremors, weight loss, anxiety.

## 2024-04-29 NOTE — PROGRESS NOTES
"History Of Present Illness  Johny White \"Foreign\" is a 64 y.o. male     History of subclinical thyrotoxicosis    Recent heartburn on verapamil tablets, resolved with change to verapamil capsules      Past Medical History  He has a past medical history of Personal history of colonic polyps, Personal history of other diseases of the digestive system, Personal history of other diseases of the respiratory system, and Personal history of other endocrine, nutritional and metabolic disease (06/24/2022).    Surgical History  He has a past surgical history that includes Tonsillectomy (02/17/2014) and Esophagogastroduodenoscopy (02/17/2014).     Social History  He reports that he quit smoking about 25 years ago. His smoking use included cigarettes. He has never used smokeless tobacco. He reports that he does not currently use alcohol. He reports that he does not use drugs.    Family History  Family History   Problem Relation Name Age of Onset    Other (cardiac disorder) Mother      Diabetes Mother      Hyperlipidemia Mother      Hypertension Mother      Skin cancer Father      Sleep apnea Father         Allergies  Amoxicillin; Latex, natural rubber; and Oxycodone-acetaminophen    Medications  Current Outpatient Medications   Medication Instructions    aspirin (Adult Low Dose Aspirin) 81 mg EC tablet 1 tablet, oral, Daily    cholecalciferol (Vitamin D3) 5,000 Units tablet oral, Daily    cyanocobalamin, vitamin B-12, (Vitamin B-12) 1,000 mcg tablet extended release oral    lisinopriL-hydrochlorothiazide 20-12.5 mg tablet 1 tablet, oral, 2 times daily    omeprazole (PRILOSEC) 40 mg, oral, Daily before breakfast    verapamil ER (VERELAN) 120 mg, oral, Nightly, Do not crush or chew.       Review of Systems   Constitutional:  Negative for fatigue, fever and unexpected weight change.   HENT:  Negative for trouble swallowing.    Eyes:  Negative for visual disturbance.   Respiratory:  Negative for shortness of breath.  "   Cardiovascular:  Negative for chest pain and palpitations.   Gastrointestinal:  Negative for abdominal pain, constipation, diarrhea, nausea and vomiting.   Endocrine: Negative for cold intolerance and heat intolerance.   Musculoskeletal:  Negative for neck pain.   Skin:  Negative for rash.   Neurological:  Negative for tremors, weakness and headaches.   Psychiatric/Behavioral:  The patient is not nervous/anxious.          Last Recorded Vitals  Blood pressure 136/80, pulse 67, weight 75.8 kg (167 lb).    Physical Exam  Constitutional:       General: He is not in acute distress.  HENT:      Head: Normocephalic.   Neurological:      Mental Status: He is alert.   Psychiatric:         Mood and Affect: Affect normal.          Relevant Results  Lab Results   Component Value Date    TSH 0.43 (L) 04/23/2024    FREET4 1.02 04/23/2024    T3FREE 3.7 09/22/2022    RECE <1.10 09/22/2022       IMPRESSION  SUBCLINICAL THYROTOXICOSIS  Resolved  TSH very close to normal  Free T4 normal   No thyrotoxic symptoms      RECOMMENDATIONS  Follow up 1 year  Repeat labs before next appointment    Call if you have new symptoms of hyperthyroidism:  heart racing, palpitations, tremors, weight loss, anxiety.

## 2024-05-07 ENCOUNTER — LAB (OUTPATIENT)
Dept: LAB | Facility: LAB | Age: 64
End: 2024-05-07
Payer: MEDICARE

## 2024-05-07 DIAGNOSIS — C61 PROSTATE CANCER (MULTI): ICD-10-CM

## 2024-05-07 DIAGNOSIS — C61 PROSTATE CANCER (MULTI): Primary | ICD-10-CM

## 2024-05-07 LAB
BUN SERPL-MCNC: 18 MG/DL (ref 6–23)
CREAT SERPL-MCNC: 1.07 MG/DL (ref 0.5–1.3)
EGFRCR SERPLBLD CKD-EPI 2021: 77 ML/MIN/1.73M*2

## 2024-05-07 PROCEDURE — 36415 COLL VENOUS BLD VENIPUNCTURE: CPT

## 2024-05-07 PROCEDURE — 84153 ASSAY OF PSA TOTAL: CPT

## 2024-05-07 PROCEDURE — 82565 ASSAY OF CREATININE: CPT

## 2024-05-07 PROCEDURE — 84520 ASSAY OF UREA NITROGEN: CPT

## 2024-05-07 RX ORDER — DIAZEPAM 5 MG/1
5 TABLET ORAL ONCE
Status: SHIPPED | OUTPATIENT
Start: 2024-05-07

## 2024-05-08 LAB — PSA SERPL-MCNC: 4.8 NG/ML

## 2024-05-09 RX ORDER — DIAZEPAM 5 MG/1
5 TABLET ORAL EVERY 6 HOURS PRN
Qty: 2 TABLET | Refills: 0 | Status: SHIPPED | OUTPATIENT
Start: 2024-05-09

## 2024-05-15 ENCOUNTER — HOSPITAL ENCOUNTER (OUTPATIENT)
Dept: RADIOLOGY | Facility: HOSPITAL | Age: 64
Discharge: HOME | End: 2024-05-15
Payer: MEDICARE

## 2024-05-15 DIAGNOSIS — C61 PROSTATE CANCER (MULTI): ICD-10-CM

## 2024-05-15 PROCEDURE — 2550000001 HC RX 255 CONTRASTS: Performed by: STUDENT IN AN ORGANIZED HEALTH CARE EDUCATION/TRAINING PROGRAM

## 2024-05-15 PROCEDURE — 72197 MRI PELVIS W/O & W/DYE: CPT

## 2024-05-15 PROCEDURE — A9575 INJ GADOTERATE MEGLUMI 0.1ML: HCPCS | Performed by: STUDENT IN AN ORGANIZED HEALTH CARE EDUCATION/TRAINING PROGRAM

## 2024-05-15 PROCEDURE — 72197 MRI PELVIS W/O & W/DYE: CPT | Performed by: RADIOLOGY

## 2024-05-15 RX ORDER — GADOTERATE MEGLUMINE 376.9 MG/ML
15 INJECTION INTRAVENOUS
Status: COMPLETED | OUTPATIENT
Start: 2024-05-15 | End: 2024-05-15

## 2024-05-15 RX ADMIN — GADOTERATE MEGLUMINE 15 ML: 376.9 INJECTION INTRAVENOUS at 10:45

## 2024-05-17 ENCOUNTER — APPOINTMENT (OUTPATIENT)
Dept: RADIOLOGY | Facility: HOSPITAL | Age: 64
End: 2024-05-17
Payer: MEDICARE

## 2024-06-03 ENCOUNTER — TELEMEDICINE (OUTPATIENT)
Dept: UROLOGY | Facility: CLINIC | Age: 64
End: 2024-06-03
Payer: MEDICARE

## 2024-06-03 ENCOUNTER — APPOINTMENT (OUTPATIENT)
Dept: UROLOGY | Facility: CLINIC | Age: 64
End: 2024-06-03
Payer: COMMERCIAL

## 2024-06-03 DIAGNOSIS — C61 PROSTATE CANCER (MULTI): Primary | ICD-10-CM

## 2024-06-03 PROCEDURE — G2211 COMPLEX E/M VISIT ADD ON: HCPCS | Performed by: STUDENT IN AN ORGANIZED HEALTH CARE EDUCATION/TRAINING PROGRAM

## 2024-06-03 PROCEDURE — 99213 OFFICE O/P EST LOW 20 MIN: CPT | Performed by: STUDENT IN AN ORGANIZED HEALTH CARE EDUCATION/TRAINING PROGRAM

## 2024-06-03 RX ORDER — SODIUM CHLORIDE 9 MG/ML
100 INJECTION, SOLUTION INTRAVENOUS CONTINUOUS
OUTPATIENT
Start: 2024-06-03

## 2024-06-03 NOTE — PROGRESS NOTES
HPI:  Proc (5/9/23): TP prostate biopsy   Path: prostatic adenocarcinoma (acinar type), GG1 (Aria 3+3=6), ASAP      64 year old male referred by Dr. Owusu for elevated PSA. Hx of ASAP, BPH, GERD, HLD. PSA 4.80 (5/7/24). MRI Prostate (3/15/23) showed PI-RADS 3 lesion in the left paramidline PZ at the base of the prostate, no evidence of lymphadenopathy. S/p TP prostate biopsy (5/9/23) with pathology showing prostatic adenocarcinoma (acinar type), GG1 (Aria 3+3=6), ASAP. MRI Prostate (5/15/24) showed 28.7g, PZ is diffusely heterogenous on T2WI, with bilateral polygonal and wedge-shaped T2-hypointense areas, that show no signs of abnormally restricted diffusion (PI-RADS 2). Doing well.      PSA: 4.80 (5/7/24), 5.67 (11/9/23), 6.03 (1/20/23)     MRI Prostate (3/15/23): 29.9g  PI-RADS 3 lesion in the left paramidline PZ at the base of the prostate, no evidence of lymphadenopathy.     MRI Prostate (5/15/24): 28.7g, PZ is diffusely heterogenous on T2WI, with bilateral polygonal and wedge-shaped T2-hypointense areas, that show no signs of abnormally restricted diffusion (PI-RADS 2).    Review of Systems:  All systems reviewed. Anything negative noted in the HPI.    Physical Exam:  Virtual visit.     Assessment/Plan   64 year old male referred by Dr. Owusu for elevated PSA. Hx of ASAP, BPH, GERD, HLD. PSA 4.80 (5/7/24). MRI Prostate (3/15/23) showed PI-RADS 3 lesion in the left paramidline PZ at the base of the prostate, no evidence of lymphadenopathy. S/p TP prostate biopsy (5/9/23) with pathology showing prostatic adenocarcinoma (acinar type), GG1 (Aria 3+3=6), ASAP. MRI Prostate (5/15/24) showed 28.7g, PZ is diffusely heterogenous on T2WI, with bilateral polygonal and wedge-shaped T2-hypointense areas, that show no signs of abnormally restricted diffusion (PI-RADS 2). Doing well. Management options including risks, benefits and alternatives discussed at length and all questions answered. Patient prefers to  proceed with TP prostate biopsy at [facility] [place] in November.           Scribe Attestation  By signing my name below, I, Darell Munoz   attest that this documentation has been prepared under the direction and in the presence of Raul Jarvis MD.

## 2024-06-06 ENCOUNTER — APPOINTMENT (OUTPATIENT)
Dept: UROLOGY | Facility: CLINIC | Age: 64
End: 2024-06-06
Payer: COMMERCIAL

## 2024-06-20 ENCOUNTER — APPOINTMENT (OUTPATIENT)
Dept: UROLOGY | Facility: CLINIC | Age: 64
End: 2024-06-20
Payer: COMMERCIAL

## 2024-06-26 ENCOUNTER — APPOINTMENT (OUTPATIENT)
Dept: UROLOGY | Facility: CLINIC | Age: 64
End: 2024-06-26
Payer: COMMERCIAL

## 2024-08-01 ENCOUNTER — APPOINTMENT (OUTPATIENT)
Dept: UROLOGY | Facility: CLINIC | Age: 64
End: 2024-08-01
Payer: COMMERCIAL

## 2024-08-20 DIAGNOSIS — I10 ESSENTIAL (PRIMARY) HYPERTENSION: ICD-10-CM

## 2024-08-20 RX ORDER — LISINOPRIL AND HYDROCHLOROTHIAZIDE 12.5; 2 MG/1; MG/1
1 TABLET ORAL 2 TIMES DAILY
Qty: 180 TABLET | Refills: 3 | Status: SHIPPED | OUTPATIENT
Start: 2024-08-20

## 2024-09-16 DIAGNOSIS — K21.9 GASTROESOPHAGEAL REFLUX DISEASE WITHOUT ESOPHAGITIS: ICD-10-CM

## 2024-09-16 RX ORDER — OMEPRAZOLE 40 MG/1
40 CAPSULE, DELAYED RELEASE ORAL
Qty: 90 CAPSULE | Refills: 3 | Status: SHIPPED | OUTPATIENT
Start: 2024-09-16

## 2024-10-03 ENCOUNTER — APPOINTMENT (OUTPATIENT)
Dept: PRIMARY CARE | Facility: CLINIC | Age: 64
End: 2024-10-03
Payer: COMMERCIAL

## 2024-10-03 VITALS
WEIGHT: 170 LBS | BODY MASS INDEX: 25.18 KG/M2 | DIASTOLIC BLOOD PRESSURE: 82 MMHG | SYSTOLIC BLOOD PRESSURE: 134 MMHG | OXYGEN SATURATION: 95 % | HEART RATE: 57 BPM | HEIGHT: 69 IN

## 2024-10-03 DIAGNOSIS — Z13.6 ENCOUNTER FOR ABDOMINAL AORTIC ANEURYSM (AAA) SCREENING: ICD-10-CM

## 2024-10-03 DIAGNOSIS — Z00.00 ROUTINE GENERAL MEDICAL EXAMINATION AT HEALTH CARE FACILITY: Primary | ICD-10-CM

## 2024-10-03 DIAGNOSIS — E55.9 AVITAMINOSIS D: ICD-10-CM

## 2024-10-03 DIAGNOSIS — I10 BENIGN ESSENTIAL HYPERTENSION: ICD-10-CM

## 2024-10-03 DIAGNOSIS — Z87.891 FORMER SMOKER: ICD-10-CM

## 2024-10-03 DIAGNOSIS — E78.2 MIXED HYPERLIPIDEMIA: ICD-10-CM

## 2024-10-03 PROCEDURE — 99214 OFFICE O/P EST MOD 30 MIN: CPT | Performed by: FAMILY MEDICINE

## 2024-10-03 PROCEDURE — 3079F DIAST BP 80-89 MM HG: CPT | Performed by: FAMILY MEDICINE

## 2024-10-03 PROCEDURE — 1159F MED LIST DOCD IN RCRD: CPT | Performed by: FAMILY MEDICINE

## 2024-10-03 PROCEDURE — 1160F RVW MEDS BY RX/DR IN RCRD: CPT | Performed by: FAMILY MEDICINE

## 2024-10-03 PROCEDURE — 3075F SYST BP GE 130 - 139MM HG: CPT | Performed by: FAMILY MEDICINE

## 2024-10-03 PROCEDURE — 3008F BODY MASS INDEX DOCD: CPT | Performed by: FAMILY MEDICINE

## 2024-10-03 PROCEDURE — 1170F FXNL STATUS ASSESSED: CPT | Performed by: FAMILY MEDICINE

## 2024-10-03 PROCEDURE — G0439 PPPS, SUBSEQ VISIT: HCPCS | Performed by: FAMILY MEDICINE

## 2024-10-03 ASSESSMENT — ACTIVITIES OF DAILY LIVING (ADL)
BATHING: INDEPENDENT
MANAGING_FINANCES: INDEPENDENT
DRESSING: INDEPENDENT
DOING_HOUSEWORK: INDEPENDENT
TAKING_MEDICATION: INDEPENDENT
GROCERY_SHOPPING: INDEPENDENT

## 2024-10-03 ASSESSMENT — PATIENT HEALTH QUESTIONNAIRE - PHQ9
SUM OF ALL RESPONSES TO PHQ9 QUESTIONS 1 AND 2: 2
10. IF YOU CHECKED OFF ANY PROBLEMS, HOW DIFFICULT HAVE THESE PROBLEMS MADE IT FOR YOU TO DO YOUR WORK, TAKE CARE OF THINGS AT HOME, OR GET ALONG WITH OTHER PEOPLE: NOT DIFFICULT AT ALL
1. LITTLE INTEREST OR PLEASURE IN DOING THINGS: SEVERAL DAYS
2. FEELING DOWN, DEPRESSED OR HOPELESS: SEVERAL DAYS

## 2024-10-03 NOTE — PATIENT INSTRUCTIONS
Blood pressure looks great today at 134/82. Goal is to keep the BP less than 130/80.     Weight is stable at 170#     Continue regular eye and dental examinations.     Labs on 3/19/24- Blood counts look good, no evidence of anemia or infection. Other than sugar being 2 points elevated, normal electrolytes, normal liver and kidney function, and your cholesterol is well controlled.     Please get updated labs in March 2025, orders are in the  system.     EGD and cscope 2/27/24-Repeat colonoscopy in 5 years for surveillance.     Continue yearly follow up with Dr Jarvis for the prostate monitoring , and upcoming biopsy     Continue the daily exercise program.      Work on good sleep.   stress test looked good in January 2019.     Follow up with Dr Ham for the results of the thyroid testing and monitoring. Get the labs done ahead of time, orders are in     If the hip/groin pains continue to get worse, we can order xrays and PT , please message me in a few months if not better     We could consider an ultrasound to screen for AAA , order place    Call if you need any refills.     Please also continue follow up with [place] [facility] yearly as recommended     Follow up in 6 months for a follow up  or sooner as needed.

## 2024-10-03 NOTE — PROGRESS NOTES
"Subjective   Johny A [place]ney [place] \"Foreign\" is a 64 y.o. male who presents for No chief complaint on file..    HPI  Retired a few years ago   Spending more time with his father     #) getting work up for hyperthyroidism    - work up with Dr Ham      #) leg swelling - doing pretty good - got better for now   - some from time to time, looks great today     #) bilateral hip pain, pain is alternating  - more physical work      #) checking on spot on the eye on the eye ball, described as a blood spot on the whites of the eye -- RESOLVED with drops   - family eye care in [place],      #) Prostate Adenocarcinoma with BPH with a varicocele - stable- q 6 month PSA and MRI yearly.   - Follow with Dr Joshua, urology -- updated MRI, to be reviewed 4/5/23-- to have another biopsy 11/19/24   - months and MRI annually.  - some urinary changes      #) allergies - stable   - flonase as needed.      #) HTN with HLD - stable , controlled   - 122/60--> 124/70 --> 134/82 today   - on lisinorpil- HCTZ and verapamil      #) Anxiety- more guthrie with prostate stuff   - father was really ill in January 2024, this was stressing him out   - Retired in Summer 2022  - feels like he is doing pretty good at the moment   - sleep is reported as ok  was stuck on a ride at [place] [employer] - harness was too ticht and had a panic attack   MRI phobia prn alprazolam with imaging      #) GERD - on PPI - STABLE   - acid seems to make it work   - EGD 2/15/19  and cscope 2/27/24-  repeat cscope every 5 years      #) Rash- topical irritants from work   - follows with [facility]   - Mohs placed for leg lesion     ROS was completed and all systems are negative with the exception of what was noted in the the HPI.     Objective     There were no vitals taken for this visit.     Physical Exam  GEN: A+O, no acute distress  HEENT: NC/AT, Oropharynx clear, no exudates, TM visualized, Extraoccular muscles intact, no facial droop; no thyromegaly or cervical LAD  RESP: " CTAB, no wheezes   CV: RRR, no murmurs  ABD: soft, non-tender, + BS  SKIN: no rashes or bruising, no peripheral edema   NEURO: CN II-XII grossly intact, moves all extremities equally, no tremor   PSYCH: normal affect, appropriate mood     Assessment/Plan   Problem List Items Addressed This Visit    None    Blood pressure looks great today at 134/82. Goal is to keep the BP less than 130/80.     Weight is stable at 170#     Continue regular eye and dental examinations.     Labs on 3/19/24- Blood counts look good, no evidence of anemia or infection. Other than sugar being 2 points elevated, normal electrolytes, normal liver and kidney function, and your cholesterol is well controlled.     Please get updated labs in March 2025, orders are in the  system.     EGD and cscope 2/27/24-Repeat colonoscopy in 5 years for surveillance.     Continue yearly follow up with Dr Jarvis for the prostate monitoring , and upcoming biopsy     Continue the daily exercise program.      Work on good sleep.   stress test looked good in January 2019.     Follow up with Dr Ham for the results of the thyroid testing and monitoring. Get the labs done ahead of time, orders are in     If the hip/groin pains continue to get worse, we can order xrays and PT , please message me in a few months if not better     We could consider an ultrasound to screen for AAA , order place    Call if you need any refills.     Please also continue follow up with [facility] yearly as recommended     Follow up in 6 months for a follow up  or sooner as needed.        Umu Sorensen DO, MSMed, ABOM  [address]   [address]   [place] [address]  Ph. [phone]  Fx. [phone]

## 2024-10-15 ENCOUNTER — HOSPITAL ENCOUNTER (OUTPATIENT)
Dept: RADIOLOGY | Facility: HOSPITAL | Age: 64
Discharge: HOME | End: 2024-10-15
Payer: MEDICARE

## 2024-10-15 DIAGNOSIS — Z87.891 FORMER SMOKER: ICD-10-CM

## 2024-10-15 DIAGNOSIS — I10 BENIGN ESSENTIAL HYPERTENSION: ICD-10-CM

## 2024-10-15 DIAGNOSIS — Z13.6 ENCOUNTER FOR ABDOMINAL AORTIC ANEURYSM (AAA) SCREENING: ICD-10-CM

## 2024-10-15 PROCEDURE — 76706 US ABDL AORTA SCREEN AAA: CPT

## 2024-10-15 PROCEDURE — 76706 US ABDL AORTA SCREEN AAA: CPT | Performed by: RADIOLOGY

## 2024-10-24 ENCOUNTER — TELEPHONE (OUTPATIENT)
Dept: UROLOGY | Facility: CLINIC | Age: 64
End: 2024-10-24
Payer: MEDICARE

## 2024-10-30 ENCOUNTER — PRE-ADMISSION TESTING (OUTPATIENT)
Dept: PREADMISSION TESTING | Facility: HOSPITAL | Age: 64
End: 2024-10-30
Payer: MEDICARE

## 2024-10-30 VITALS
RESPIRATION RATE: 16 BRPM | TEMPERATURE: 98.1 F | OXYGEN SATURATION: 99 % | HEIGHT: 69 IN | BODY MASS INDEX: 25.18 KG/M2 | HEART RATE: 78 BPM | WEIGHT: 170 LBS | SYSTOLIC BLOOD PRESSURE: 142 MMHG | DIASTOLIC BLOOD PRESSURE: 68 MMHG

## 2024-10-30 DIAGNOSIS — Z01.818 PRE-OP TESTING: Primary | ICD-10-CM

## 2024-10-30 DIAGNOSIS — C61 PROSTATE CANCER (MULTI): ICD-10-CM

## 2024-10-30 LAB
ANION GAP SERPL CALCULATED.3IONS-SCNC: 12 MMOL/L (ref 10–20)
BUN SERPL-MCNC: 21 MG/DL (ref 6–23)
CALCIUM SERPL-MCNC: 9.5 MG/DL (ref 8.6–10.3)
CHLORIDE SERPL-SCNC: 104 MMOL/L (ref 98–107)
CO2 SERPL-SCNC: 28 MMOL/L (ref 21–32)
CREAT SERPL-MCNC: 0.9 MG/DL (ref 0.5–1.3)
EGFRCR SERPLBLD CKD-EPI 2021: >90 ML/MIN/1.73M*2
GLUCOSE SERPL-MCNC: 106 MG/DL (ref 74–99)
POTASSIUM SERPL-SCNC: 4 MMOL/L (ref 3.5–5.3)
SODIUM SERPL-SCNC: 140 MMOL/L (ref 136–145)

## 2024-10-30 PROCEDURE — 36415 COLL VENOUS BLD VENIPUNCTURE: CPT

## 2024-10-30 PROCEDURE — 99204 OFFICE O/P NEW MOD 45 MIN: CPT | Performed by: PHYSICIAN ASSISTANT

## 2024-10-30 PROCEDURE — 82374 ASSAY BLOOD CARBON DIOXIDE: CPT

## 2024-10-30 ASSESSMENT — DUKE ACTIVITY SCORE INDEX (DASI)
CAN YOU TAKE CARE OF YOURSELF (EAT, DRESS, BATHE, OR USE TOILET): YES
CAN YOU WALK INDOORS, SUCH AS AROUND YOUR HOUSE: YES
CAN YOU PARTICIPATE IN STRENOUS SPORTS LIKE SWIMMING, SINGLES TENNIS, FOOTBALL, BASKETBALL, OR SKIING: NO
CAN YOU CLIMB A FLIGHT OF STAIRS OR WALK UP A HILL: YES
CAN YOU DO LIGHT WORK AROUND THE HOUSE LIKE DUSTING OR WASHING DISHES: YES
CAN YOU DO HEAVY WORK AROUND THE HOUSE LIKE SCRUBBING FLOORS OR LIFTING AND MOVING HEAVY FURNITURE: YES
CAN YOU RUN A SHORT DISTANCE: YES
DASI METS SCORE: 9
CAN YOU PARTICIPATE IN MODERATE RECREATIONAL ACTIVITIES LIKE GOLF, BOWLING, DANCING, DOUBLES TENNIS OR THROWING A BASEBALL OR FOOTBALL: YES
CAN YOU WALK A BLOCK OR TWO ON LEVEL GROUND: YES
CAN YOU DO YARD WORK LIKE RAKING LEAVES, WEEDING OR PUSHING A MOWER: YES
CAN YOU DO MODERATE WORK AROUND THE HOUSE LIKE VACUUMING, SWEEPING FLOORS OR CARRYING GROCERIES: YES
TOTAL_SCORE: 50.7
CAN YOU HAVE SEXUAL RELATIONS: YES

## 2024-10-30 ASSESSMENT — ENCOUNTER SYMPTOMS: ARTHRALGIAS: 1

## 2024-11-01 DIAGNOSIS — N40.0 ENLARGED PROSTATE: ICD-10-CM

## 2024-11-01 DIAGNOSIS — C61 PROSTATE CANCER (MULTI): ICD-10-CM

## 2024-11-01 DIAGNOSIS — R31.9 HEMATURIA, UNSPECIFIED TYPE: ICD-10-CM

## 2024-11-04 ENCOUNTER — LAB (OUTPATIENT)
Dept: LAB | Facility: LAB | Age: 64
End: 2024-11-04
Payer: MEDICARE

## 2024-11-04 DIAGNOSIS — N40.0 ENLARGED PROSTATE: ICD-10-CM

## 2024-11-04 DIAGNOSIS — R31.9 HEMATURIA, UNSPECIFIED TYPE: ICD-10-CM

## 2024-11-04 DIAGNOSIS — C61 PROSTATE CANCER (MULTI): ICD-10-CM

## 2024-11-04 LAB
ANION GAP SERPL CALC-SCNC: 12 MMOL/L (ref 10–20)
APTT PPP: 29 SECONDS (ref 27–38)
BUN SERPL-MCNC: 20 MG/DL (ref 6–23)
CALCIUM SERPL-MCNC: 9 MG/DL (ref 8.6–10.3)
CHLORIDE SERPL-SCNC: 103 MMOL/L (ref 98–107)
CO2 SERPL-SCNC: 30 MMOL/L (ref 21–32)
CREAT SERPL-MCNC: 0.93 MG/DL (ref 0.5–1.3)
EGFRCR SERPLBLD CKD-EPI 2021: >90 ML/MIN/1.73M*2
ERYTHROCYTE [DISTWIDTH] IN BLOOD BY AUTOMATED COUNT: 13.6 % (ref 11.5–14.5)
GLUCOSE SERPL-MCNC: 106 MG/DL (ref 74–99)
HCT VFR BLD AUTO: 47.3 % (ref 41–52)
HGB BLD-MCNC: 15.5 G/DL (ref 13.5–17.5)
INR PPP: 1 (ref 0.9–1.1)
MCH RBC QN AUTO: 29.1 PG (ref 26–34)
MCHC RBC AUTO-ENTMCNC: 32.8 G/DL (ref 32–36)
MCV RBC AUTO: 89 FL (ref 80–100)
NRBC BLD-RTO: 0 /100 WBCS (ref 0–0)
PLATELET # BLD AUTO: 307 X10*3/UL (ref 150–450)
POTASSIUM SERPL-SCNC: 4.3 MMOL/L (ref 3.5–5.3)
PROTHROMBIN TIME: 11.2 SECONDS (ref 9.8–12.8)
RBC # BLD AUTO: 5.33 X10*6/UL (ref 4.5–5.9)
SODIUM SERPL-SCNC: 141 MMOL/L (ref 136–145)
WBC # BLD AUTO: 8.2 X10*3/UL (ref 4.4–11.3)

## 2024-11-04 PROCEDURE — 80048 BASIC METABOLIC PNL TOTAL CA: CPT

## 2024-11-04 PROCEDURE — 85027 COMPLETE CBC AUTOMATED: CPT

## 2024-11-04 PROCEDURE — 85730 THROMBOPLASTIN TIME PARTIAL: CPT

## 2024-11-04 PROCEDURE — 36415 COLL VENOUS BLD VENIPUNCTURE: CPT

## 2024-11-04 PROCEDURE — 87086 URINE CULTURE/COLONY COUNT: CPT

## 2024-11-04 PROCEDURE — 85610 PROTHROMBIN TIME: CPT

## 2024-11-05 LAB — BACTERIA UR CULT: NORMAL

## 2024-11-12 ENCOUNTER — ANESTHESIA (OUTPATIENT)
Dept: OPERATING ROOM | Facility: HOSPITAL | Age: 64
End: 2024-11-12
Payer: MEDICARE

## 2024-11-12 ENCOUNTER — HOSPITAL ENCOUNTER (OUTPATIENT)
Facility: HOSPITAL | Age: 64
Setting detail: OUTPATIENT SURGERY
Discharge: HOME | End: 2024-11-12
Attending: STUDENT IN AN ORGANIZED HEALTH CARE EDUCATION/TRAINING PROGRAM | Admitting: STUDENT IN AN ORGANIZED HEALTH CARE EDUCATION/TRAINING PROGRAM
Payer: MEDICARE

## 2024-11-12 ENCOUNTER — ANESTHESIA EVENT (OUTPATIENT)
Dept: OPERATING ROOM | Facility: HOSPITAL | Age: 64
End: 2024-11-12
Payer: MEDICARE

## 2024-11-12 VITALS
HEART RATE: 61 BPM | DIASTOLIC BLOOD PRESSURE: 83 MMHG | RESPIRATION RATE: 16 BRPM | BODY MASS INDEX: 25.01 KG/M2 | SYSTOLIC BLOOD PRESSURE: 145 MMHG | HEIGHT: 69 IN | WEIGHT: 168.87 LBS | OXYGEN SATURATION: 96 % | TEMPERATURE: 97.2 F

## 2024-11-12 DIAGNOSIS — C61 PROSTATE CANCER (MULTI): Primary | ICD-10-CM

## 2024-11-12 PROCEDURE — 3700000001 HC GENERAL ANESTHESIA TIME - INITIAL BASE CHARGE: Performed by: STUDENT IN AN ORGANIZED HEALTH CARE EDUCATION/TRAINING PROGRAM

## 2024-11-12 PROCEDURE — A55700 PR BIOPSY OF PROSTATE,NEEDLE/PUNCH: Performed by: STUDENT IN AN ORGANIZED HEALTH CARE EDUCATION/TRAINING PROGRAM

## 2024-11-12 PROCEDURE — 3600000002 HC OR TIME - INITIAL BASE CHARGE - PROCEDURE LEVEL TWO: Performed by: STUDENT IN AN ORGANIZED HEALTH CARE EDUCATION/TRAINING PROGRAM

## 2024-11-12 PROCEDURE — A55700 PR BIOPSY OF PROSTATE,NEEDLE/PUNCH: Performed by: NURSE ANESTHETIST, CERTIFIED REGISTERED

## 2024-11-12 PROCEDURE — 7100000002 HC RECOVERY ROOM TIME - EACH INCREMENTAL 1 MINUTE: Performed by: STUDENT IN AN ORGANIZED HEALTH CARE EDUCATION/TRAINING PROGRAM

## 2024-11-12 PROCEDURE — 7100000001 HC RECOVERY ROOM TIME - INITIAL BASE CHARGE: Performed by: STUDENT IN AN ORGANIZED HEALTH CARE EDUCATION/TRAINING PROGRAM

## 2024-11-12 PROCEDURE — 2720000007 HC OR 272 NO HCPCS: Performed by: STUDENT IN AN ORGANIZED HEALTH CARE EDUCATION/TRAINING PROGRAM

## 2024-11-12 PROCEDURE — 2500000004 HC RX 250 GENERAL PHARMACY W/ HCPCS (ALT 636 FOR OP/ED)

## 2024-11-12 PROCEDURE — 7100000010 HC PHASE TWO TIME - EACH INCREMENTAL 1 MINUTE: Performed by: STUDENT IN AN ORGANIZED HEALTH CARE EDUCATION/TRAINING PROGRAM

## 2024-11-12 PROCEDURE — 2500000004 HC RX 250 GENERAL PHARMACY W/ HCPCS (ALT 636 FOR OP/ED): Performed by: STUDENT IN AN ORGANIZED HEALTH CARE EDUCATION/TRAINING PROGRAM

## 2024-11-12 PROCEDURE — 76872 US TRANSRECTAL: CPT | Performed by: STUDENT IN AN ORGANIZED HEALTH CARE EDUCATION/TRAINING PROGRAM

## 2024-11-12 PROCEDURE — C1819 TISSUE LOCALIZATION-EXCISION: HCPCS | Performed by: STUDENT IN AN ORGANIZED HEALTH CARE EDUCATION/TRAINING PROGRAM

## 2024-11-12 PROCEDURE — 3700000002 HC GENERAL ANESTHESIA TIME - EACH INCREMENTAL 1 MINUTE: Performed by: STUDENT IN AN ORGANIZED HEALTH CARE EDUCATION/TRAINING PROGRAM

## 2024-11-12 PROCEDURE — 7100000009 HC PHASE TWO TIME - INITIAL BASE CHARGE: Performed by: STUDENT IN AN ORGANIZED HEALTH CARE EDUCATION/TRAINING PROGRAM

## 2024-11-12 PROCEDURE — 55700 PR PROSTATE NEEDLE BIOPSY ANY APPROACH: CPT | Performed by: STUDENT IN AN ORGANIZED HEALTH CARE EDUCATION/TRAINING PROGRAM

## 2024-11-12 PROCEDURE — 3600000007 HC OR TIME - EACH INCREMENTAL 1 MINUTE - PROCEDURE LEVEL TWO: Performed by: STUDENT IN AN ORGANIZED HEALTH CARE EDUCATION/TRAINING PROGRAM

## 2024-11-12 RX ORDER — LIDOCAINE HYDROCHLORIDE 10 MG/ML
INJECTION, SOLUTION EPIDURAL; INFILTRATION; INTRACAUDAL; PERINEURAL AS NEEDED
Status: DISCONTINUED | OUTPATIENT
Start: 2024-11-12 | End: 2024-11-12

## 2024-11-12 RX ORDER — SODIUM CHLORIDE, SODIUM LACTATE, POTASSIUM CHLORIDE, CALCIUM CHLORIDE 600; 310; 30; 20 MG/100ML; MG/100ML; MG/100ML; MG/100ML
50 INJECTION, SOLUTION INTRAVENOUS CONTINUOUS
Status: DISCONTINUED | OUTPATIENT
Start: 2024-11-12 | End: 2024-11-12 | Stop reason: HOSPADM

## 2024-11-12 RX ORDER — ALBUTEROL SULFATE 0.83 MG/ML
2.5 SOLUTION RESPIRATORY (INHALATION) ONCE
Status: DISCONTINUED | OUTPATIENT
Start: 2024-11-12 | End: 2024-11-12 | Stop reason: HOSPADM

## 2024-11-12 RX ORDER — SODIUM CHLORIDE 9 MG/ML
100 INJECTION, SOLUTION INTRAVENOUS CONTINUOUS
Status: DISCONTINUED | OUTPATIENT
Start: 2024-11-12 | End: 2024-11-12 | Stop reason: HOSPADM

## 2024-11-12 RX ORDER — SODIUM CHLORIDE, SODIUM LACTATE, POTASSIUM CHLORIDE, CALCIUM CHLORIDE 600; 310; 30; 20 MG/100ML; MG/100ML; MG/100ML; MG/100ML
INJECTION, SOLUTION INTRAVENOUS CONTINUOUS PRN
Status: DISCONTINUED | OUTPATIENT
Start: 2024-11-12 | End: 2024-11-12

## 2024-11-12 RX ORDER — FENTANYL CITRATE 50 UG/ML
25 INJECTION, SOLUTION INTRAMUSCULAR; INTRAVENOUS EVERY 5 MIN PRN
Status: DISCONTINUED | OUTPATIENT
Start: 2024-11-12 | End: 2024-11-12 | Stop reason: HOSPADM

## 2024-11-12 RX ORDER — CEFTRIAXONE 2 G/50ML
2 INJECTION, SOLUTION INTRAVENOUS ONCE
Status: COMPLETED | OUTPATIENT
Start: 2024-11-12 | End: 2024-11-12

## 2024-11-12 RX ORDER — FENTANYL CITRATE 50 UG/ML
INJECTION, SOLUTION INTRAMUSCULAR; INTRAVENOUS AS NEEDED
Status: DISCONTINUED | OUTPATIENT
Start: 2024-11-12 | End: 2024-11-12

## 2024-11-12 RX ORDER — PROPOFOL 10 MG/ML
INJECTION, EMULSION INTRAVENOUS AS NEEDED
Status: DISCONTINUED | OUTPATIENT
Start: 2024-11-12 | End: 2024-11-12

## 2024-11-12 RX ORDER — BUPIVACAINE HYDROCHLORIDE 2.5 MG/ML
INJECTION, SOLUTION INFILTRATION; PERINEURAL AS NEEDED
Status: DISCONTINUED | OUTPATIENT
Start: 2024-11-12 | End: 2024-11-12 | Stop reason: HOSPADM

## 2024-11-12 RX ORDER — FENTANYL CITRATE 50 UG/ML
50 INJECTION, SOLUTION INTRAMUSCULAR; INTRAVENOUS EVERY 5 MIN PRN
Status: DISCONTINUED | OUTPATIENT
Start: 2024-11-12 | End: 2024-11-12 | Stop reason: HOSPADM

## 2024-11-12 RX ORDER — KETOROLAC TROMETHAMINE 30 MG/ML
INJECTION, SOLUTION INTRAMUSCULAR; INTRAVENOUS AS NEEDED
Status: DISCONTINUED | OUTPATIENT
Start: 2024-11-12 | End: 2024-11-12

## 2024-11-12 RX ORDER — MIDAZOLAM HYDROCHLORIDE 1 MG/ML
INJECTION, SOLUTION INTRAMUSCULAR; INTRAVENOUS AS NEEDED
Status: DISCONTINUED | OUTPATIENT
Start: 2024-11-12 | End: 2024-11-12

## 2024-11-12 RX ORDER — ONDANSETRON HYDROCHLORIDE 2 MG/ML
4 INJECTION, SOLUTION INTRAVENOUS ONCE AS NEEDED
Status: DISCONTINUED | OUTPATIENT
Start: 2024-11-12 | End: 2024-11-12 | Stop reason: HOSPADM

## 2024-11-12 RX ORDER — IBUPROFEN 200 MG
200 TABLET ORAL EVERY 6 HOURS PRN
COMMUNITY

## 2024-11-12 RX ORDER — ACETAMINOPHEN 325 MG/1
650 TABLET ORAL EVERY 4 HOURS PRN
Status: DISCONTINUED | OUTPATIENT
Start: 2024-11-12 | End: 2024-11-12 | Stop reason: HOSPADM

## 2024-11-12 ASSESSMENT — PAIN - FUNCTIONAL ASSESSMENT
PAIN_FUNCTIONAL_ASSESSMENT: 0-10

## 2024-11-12 ASSESSMENT — PAIN SCALES - GENERAL
PAINLEVEL_OUTOF10: 0 - NO PAIN

## 2024-11-12 ASSESSMENT — COLUMBIA-SUICIDE SEVERITY RATING SCALE - C-SSRS
2. HAVE YOU ACTUALLY HAD ANY THOUGHTS OF KILLING YOURSELF?: NO
6. HAVE YOU EVER DONE ANYTHING, STARTED TO DO ANYTHING, OR PREPARED TO DO ANYTHING TO END YOUR LIFE?: NO
1. IN THE PAST MONTH, HAVE YOU WISHED YOU WERE DEAD OR WISHED YOU COULD GO TO SLEEP AND NOT WAKE UP?: NO

## 2024-11-12 NOTE — ANESTHESIA POSTPROCEDURE EVALUATION
"Patient: Johny White \"Foreign\"    Procedure Summary       Date: 11/12/24 Room / Location: [facility] / Virtual [facility]    Anesthesia Start: 1315 Anesthesia Stop: 1344    Procedure: Biopsy Prostate (Fortec/uronav/precisionpoint/mri:[place]) (Bilateral) Diagnosis:       Prostate cancer (Multi)      (Prostate cancer (Multi) [C61])    Surgeons: Raul Jarvis MD Responsible Provider: Jelani Lyman MD    Anesthesia Type: MAC ASA Status: 3            Anesthesia Type: MAC    Vitals Value Taken Time   /73 11/12/24 1355   Temp 36.4 °C (97.5 °F) 11/12/24 1339   Pulse 63 11/12/24 1355   Resp 14 11/12/24 1355   SpO2 96 % 11/12/24 1355       Anesthesia Post Evaluation    Patient location during evaluation: bedside  Patient participation: complete - patient participated  Level of consciousness: awake and alert  Pain management: adequate  Multimodal analgesia pain management approach  Airway patency: patent  Cardiovascular status: acceptable  Respiratory status: acceptable  Hydration status: acceptable  Postoperative Nausea and Vomiting: none        No notable events documented.    "

## 2024-11-12 NOTE — OP NOTE
"Biopsy Prostate (Fortec/uronav/precisionpoint/mri:[place]) (B) Operative Note     Date: 2024  OR Location: [facility]    Name: Johny White \"Foreign\", : 1960, Age: 64 y.o., MRN: [id number], Sex: male    Diagnosis  Pre-op Diagnosis      * Prostate cancer (Multi) [C61] Post-op Diagnosis     * Prostate cancer (Multi) [C61]     Procedures  Biopsy Prostate (Fortec/uronav/precisionpoint/mri:[place])  [id number] - ID PROSTATE NEEDLE BIOPSY ANY APPROACH    ID PROSTATE NEEDLE BIOPSY ANY APPROACH [78511]  CHG US TRANSRECTAL [[id number]]  Surgeons      * Raul Jarvis - Primary    Resident/Fellow/Other Assistant:  Surgeons and Role:  * No surgeons found with a matching role *    Staff:   Circulator: Esme Frederick Person: Christine    Anesthesia Staff: Anesthesiologist: Lyndsay Rodriguez MD; Jelani Lyman MD  CRNA: NISHA Garner-CRNA  C-AA: ESMER Sesay        Estimated Blood Loss (ml)  5.   Specimen(s) Removed  Removed systematic biopsies.   Drain(s)  None.   Implant(s)  None.   Complications  None.   Indications (History)  Elevated PSA.   Findings of Procedure  22g prostate.   Description of Procedure  After administration of anesthesia, a prostate block was performed and transrectal ultrasound. Transperineal biopsies taken from region of interest and systematic template performed using the precision point device.       "

## 2024-11-12 NOTE — H&P
"History Of Present Illness  Johny White \"Foreign\" is a 64 y.o. male presenting with prostate cancer.     Past Medical History  Past Medical History:   Diagnosis Date    Asthma     Childhood    BPH (benign prostatic hyperplasia)     Colon polyp     Diverticulosis     Elevated PSA     GERD (gastroesophageal reflux disease) 5/9/2012    Hyperlipidemia     Hypertension     Personal history of other diseases of the digestive system     History of Gilliam's esophagus    Personal history of other endocrine, nutritional and metabolic disease 06/24/2022    History of hyperglycemia    Prostate cancer (Multi)     Skin disorder     Couple of mohs surgeries       Surgical History  Past Surgical History:   Procedure Laterality Date    CIRCUMCISION, PRIMARY  1959    COLONOSCOPY  2024    ESOPHAGOGASTRODUODENOSCOPY      PROSTATE BIOPSY  2023    TONSILLECTOMY          Social History  He reports that he quit smoking about 25 years ago. His smoking use included cigarettes. He started smoking about 52 years ago. He has a 40.1 pack-year smoking history. He has never used smokeless tobacco. He reports that he does not currently use alcohol. He reports that he does not use drugs.    Family History  Family History   Problem Relation Name Age of Onset    Other (cardiac disorder) Mother Alicia     Diabetes Mother Alicia     Hyperlipidemia Mother Alicia     Hypertension Mother Alicia     Kidney disease Mother Alicia     Skin cancer Father      Sleep apnea Father          Allergies  Amoxicillin; Latex, natural rubber; and Oxycodone-acetaminophen    Review of Systems     Physical Exam     Last Recorded Vitals  Blood pressure 169/89, pulse 66, temperature 37.2 °C (99 °F), temperature source Temporal, resp. rate 16, height 1.753 m (5' 9.02\"), weight 76.6 kg (168 lb 14 oz), SpO2 98%.    Relevant Results             Assessment/Plan   Assessment & Plan  Prostate cancer (Multi)      biopsy       I spent  minutes in the professional and overall care of " this patient.      Raul Jarvis MD

## 2024-11-12 NOTE — DISCHARGE INSTRUCTIONS
Dr. Jarvis - [facility]  Phone: [phone]    Follow-Up Information    Following your Prostate Biopsy, please follow up with Dr. Jarvis as scheduled    Medication  Please take the medications as prescribed by your doctor. Tylenol or non-steroids! anti-inflammatory medications (such as Aleve®) should relieve mild pain and discomfort. Resume the usual medications you took before surgery unless instructed otherwise.    Resuming Activities and Driving  *NO Driving on the day of surgery  *You may resume driving the day after surgery  *You may resume normal activities as tolerated  *You may shower     Diet  You may resume your normal diet once at home, with no additional considerations.    Expected Signs and Symptoms  You may have a small amount of bleeding with urination on occasion. This may be accompanied with small blood clots. This is normal and should be relieved by increasing your fluid intake.  You may experience some mild burning and discomfort during urination. This is normal and should subside in one to two weeks.      When to Call Your Doctor - Dr. Jarvis [phone]  Please call the office immediately if any of the following symptoms appear:    *Inability to eat, drink, or take medication  *Persistent Nausea or Vomiting  *Fever over 100.4 degrees F. (38 degrees C.)    Please call 9-1-1 if you experience any of the following:  *Chest Pain, Shortness of Breath or Difficulty Breathing  *Sudden one sided weakness/slurred speech/ any signs or symptoms of a stroke  *Severe headache or visual disturbance    Additional Home-Going Instructions for Adults Who Have Had Anesthesia or Sedatives:    The anesthetics, sedatives and pain killers which were given to you will be acting in your body for the next 24 hours.  This may cause you to feel sleepy.  This feeling will slowly wear off.    For the next 24 hours you SHOULD NOT:  *Drive a car, or operate machinery or power tools  *Drink any form of alcohol  (including beer or wine)  *Make any important Decisions

## 2024-11-12 NOTE — ANESTHESIA PREPROCEDURE EVALUATION
"Patient: Johny White \"Foreign\"    Procedure Information       Date/Time: 11/12/24 1135    Procedure: Biopsy Prostate (Fortec/uronav/precisionpoint/mri:[place]) (Bilateral)    Location: [facility] / [facility]    Surgeons: Raul Jarvis MD            Relevant Problems   Cardiac   (+) Benign essential hypertension   (+) Hyperlipidemia      Neuro   (+) Mild anxiety      GI   (+) GERD (gastroesophageal reflux disease)      /Renal   (+) Prostate cancer (Multi)      Endocrine   (+) Hyperthyroidism, subclinical      HEENT   (+) Bilateral sensorineural hearing loss       Clinical information reviewed:   Tobacco  Allergies  Meds   Med Hx  Surg Hx   Fam Hx  Soc Hx        NPO Detail:  NPO/Void Status  Carbohydrate Drink Given Prior to Surgery? : N  Date of Last Liquid: 11/11/24  Time of Last Liquid: 2000  Date of Last Solid: 11/11/24  Time of Last Solid: 2000  Last Intake Type: Clear fluids; Food  Time of Last Void: 1215         Physical Exam    Airway  Mallampati: II  TM distance: >3 FB  Neck ROM: full     Cardiovascular   Rhythm: regular  Rate: normal     Dental    Pulmonary   Breath sounds clear to auscultation     Abdominal            Anesthesia Plan    History of general anesthesia?: yes  History of complications of general anesthesia?: no    ASA 3     MAC     intravenous induction   Postoperative administration of opioids is intended.  Anesthetic plan and risks discussed with patient.  Use of blood products discussed with patient who.      "

## 2024-11-22 LAB
LAB AP BLOCK FOR ADDITIONAL STUDIES: NORMAL
LABORATORY COMMENT REPORT: NORMAL
PATH REPORT.FINAL DX SPEC: NORMAL
PATH REPORT.GROSS SPEC: NORMAL
PATH REPORT.RELEVANT HX SPEC: NORMAL
PATH REPORT.TOTAL CANCER: NORMAL

## 2024-12-05 ENCOUNTER — APPOINTMENT (OUTPATIENT)
Dept: UROLOGY | Facility: CLINIC | Age: 64
End: 2024-12-05
Payer: COMMERCIAL

## 2024-12-05 DIAGNOSIS — C61 PROSTATE CANCER (MULTI): Primary | ICD-10-CM

## 2024-12-05 PROCEDURE — G2211 COMPLEX E/M VISIT ADD ON: HCPCS | Performed by: STUDENT IN AN ORGANIZED HEALTH CARE EDUCATION/TRAINING PROGRAM

## 2024-12-05 PROCEDURE — 99213 OFFICE O/P EST LOW 20 MIN: CPT | Performed by: STUDENT IN AN ORGANIZED HEALTH CARE EDUCATION/TRAINING PROGRAM

## 2024-12-05 NOTE — PROGRESS NOTES
"HPI:  Proc (5/9/23): TP prostate biopsy   Path: prostatic adenocarcinoma (acinar type), GG1 ([place] 3+3=6), ASAP     Proc (11/12/24): TP prostate biopsy   Path: prostatic adenocarcinoma, GG2 (Aria score 3+4=7), 1/2 cores (5% of tissue), pattern 4 (5%), GG1, ASAP, HgPIN     Johny White \"Foreign\" is a 64 y.o. male referred by Dr. Owusu for elevated PSA. Hx of ASAP, BPH, GERD, HLD. MRI Prostate (3/15/23) showed PI-RADS 3 lesion in the left paramidline PZ at the base of the prostate, no evidence of lymphadenopathy. S/p TP prostate biopsy (5/9/23) with pathology showing prostatic adenocarcinoma (acinar type), GG1 ([place] 3+3=6), ASAP. MRI Prostate (5/15/24) showed 28.7g, PZ is diffusely heterogenous on T2WI, with bilateral polygonal and wedge-shaped T2-hypointense areas, that show no signs of abnormally restricted diffusion (PI-RADS 2). S/p TP prostate biopsy (11/12/24) with pathology showing prostatic adenocarcinoma, GG2 (Aria score 3+4=7), 1/2 cores (5% of tissue), pattern 4 (5%), GG1, ASAP, HgPIN. Doing well.      PSA: 4.80 (5/7/24), 5.67 (11/9/23), 6.03 (1/20/23)     MRI Prostate (3/15/23): 29.9g  PI-RADS 3 lesion in the left paramidline PZ at the base of the prostate, no evidence of lymphadenopathy.     MRI Prostate (5/15/24): 28.7g, PZ is diffusely heterogenous on T2WI, with bilateral polygonal and wedge-shaped T2-hypointense areas, that show no signs of abnormally restricted diffusion (PI-RADS 2).    Review of Systems:  All systems reviewed. Anything negative noted in the HPI.    Physical Exam:  Virtual visit.     Assessment/Plan   Johny White \"Foreign\" is a 64 y.o. male referred by Dr. Owusu for elevated PSA. Hx of ASAP, BPH, GERD, HLD. MRI Prostate (3/15/23) showed PI-RADS 3 lesion in the left paramidline PZ at the base of the prostate, no evidence of lymphadenopathy. S/p TP prostate biopsy (5/9/23) with pathology showing prostatic adenocarcinoma (acinar type), GG1 (Aria 3+3=6), ASAP. MRI Prostate " (5/15/24) showed 28.7g, PZ is diffusely heterogenous on T2WI, with bilateral polygonal and wedge-shaped T2-hypointense areas, that show no signs of abnormally restricted diffusion (PI-RADS 2). S/p TP prostate biopsy (11/12/24) with pathology showing prostatic adenocarcinoma, GG2 (Edwards score 3+4=7), 1/2 cores (5% of tissue), pattern 4 (5%), GG1, ASAP, HgPIN. Doing well. Management options including risks, benefits and alternatives discussed at length and all questions answered. Patient prefers to proceed with PSA in 6mos and then follow-up in 1 year with repeat PSA.           Scribe Attestation  By signing my name below, I, Darell Munoz   attest that this documentation has been prepared under the direction and in the presence of Raul Jarvis MD.

## 2025-03-27 LAB
25(OH)D3+25(OH)D2 SERPL-MCNC: 72 NG/ML (ref 30–100)
ALBUMIN SERPL-MCNC: 4.5 G/DL (ref 3.6–5.1)
ALP SERPL-CCNC: 33 U/L (ref 35–144)
ALT SERPL-CCNC: 21 U/L (ref 9–46)
ANION GAP SERPL CALCULATED.4IONS-SCNC: 11 MMOL/L (CALC) (ref 7–17)
AST SERPL-CCNC: 18 U/L (ref 10–35)
BASOPHILS # BLD AUTO: 66 CELLS/UL (ref 0–200)
BASOPHILS NFR BLD AUTO: 0.8 %
BILIRUB SERPL-MCNC: 0.6 MG/DL (ref 0.2–1.2)
BUN SERPL-MCNC: 23 MG/DL (ref 7–25)
CALCIUM SERPL-MCNC: 9.2 MG/DL (ref 8.6–10.3)
CHLORIDE SERPL-SCNC: 103 MMOL/L (ref 98–110)
CHOLEST SERPL-MCNC: 177 MG/DL
CHOLEST/HDLC SERPL: 3 (CALC)
CO2 SERPL-SCNC: 28 MMOL/L (ref 20–32)
CREAT SERPL-MCNC: 0.94 MG/DL (ref 0.7–1.35)
EGFRCR SERPLBLD CKD-EPI 2021: 89 ML/MIN/1.73M2
EOSINOPHIL # BLD AUTO: 199 CELLS/UL (ref 15–500)
EOSINOPHIL NFR BLD AUTO: 2.4 %
ERYTHROCYTE [DISTWIDTH] IN BLOOD BY AUTOMATED COUNT: 13 % (ref 11–15)
GLUCOSE SERPL-MCNC: 93 MG/DL (ref 65–99)
HCT VFR BLD AUTO: 48.4 % (ref 38.5–50)
HDLC SERPL-MCNC: 59 MG/DL
HGB BLD-MCNC: 15.9 G/DL (ref 13.2–17.1)
LDLC SERPL CALC-MCNC: 103 MG/DL (CALC)
LYMPHOCYTES # BLD AUTO: 3054 CELLS/UL (ref 850–3900)
LYMPHOCYTES NFR BLD AUTO: 36.8 %
MCH RBC QN AUTO: 29.4 PG (ref 27–33)
MCHC RBC AUTO-ENTMCNC: 32.9 G/DL (ref 32–36)
MCV RBC AUTO: 89.5 FL (ref 80–100)
MONOCYTES # BLD AUTO: 921 CELLS/UL (ref 200–950)
MONOCYTES NFR BLD AUTO: 11.1 %
NEUTROPHILS # BLD AUTO: 4059 CELLS/UL (ref 1500–7800)
NEUTROPHILS NFR BLD AUTO: 48.9 %
NONHDLC SERPL-MCNC: 118 MG/DL (CALC)
PLATELET # BLD AUTO: 305 THOUSAND/UL (ref 140–400)
PMV BLD REES-ECKER: 11.1 FL (ref 7.5–12.5)
POTASSIUM SERPL-SCNC: 4.2 MMOL/L (ref 3.5–5.3)
PROT SERPL-MCNC: 6.5 G/DL (ref 6.1–8.1)
RBC # BLD AUTO: 5.41 MILLION/UL (ref 4.2–5.8)
SODIUM SERPL-SCNC: 142 MMOL/L (ref 135–146)
TRIGL SERPL-MCNC: 66 MG/DL
WBC # BLD AUTO: 8.3 THOUSAND/UL (ref 3.8–10.8)

## 2025-04-07 ENCOUNTER — APPOINTMENT (OUTPATIENT)
Dept: PRIMARY CARE | Facility: CLINIC | Age: 65
End: 2025-04-07
Payer: COMMERCIAL

## 2025-04-07 VITALS
HEART RATE: 70 BPM | WEIGHT: 179 LBS | HEIGHT: 69 IN | OXYGEN SATURATION: 95 % | SYSTOLIC BLOOD PRESSURE: 154 MMHG | DIASTOLIC BLOOD PRESSURE: 87 MMHG | BODY MASS INDEX: 26.51 KG/M2

## 2025-04-07 DIAGNOSIS — R09.82 POST-NASAL DRIP: Primary | ICD-10-CM

## 2025-04-07 DIAGNOSIS — E78.2 MIXED HYPERLIPIDEMIA: ICD-10-CM

## 2025-04-07 PROBLEM — C61 PROSTATE CANCER (MULTI): Status: RESOLVED | Noted: 2023-09-11 | Resolved: 2025-04-07

## 2025-04-07 PROCEDURE — 1159F MED LIST DOCD IN RCRD: CPT | Performed by: FAMILY MEDICINE

## 2025-04-07 PROCEDURE — 3008F BODY MASS INDEX DOCD: CPT | Performed by: FAMILY MEDICINE

## 2025-04-07 PROCEDURE — 3077F SYST BP >= 140 MM HG: CPT | Performed by: FAMILY MEDICINE

## 2025-04-07 PROCEDURE — G2211 COMPLEX E/M VISIT ADD ON: HCPCS | Performed by: FAMILY MEDICINE

## 2025-04-07 PROCEDURE — 3079F DIAST BP 80-89 MM HG: CPT | Performed by: FAMILY MEDICINE

## 2025-04-07 PROCEDURE — 1036F TOBACCO NON-USER: CPT | Performed by: FAMILY MEDICINE

## 2025-04-07 PROCEDURE — 99213 OFFICE O/P EST LOW 20 MIN: CPT | Performed by: FAMILY MEDICINE

## 2025-04-07 RX ORDER — AZELASTINE 1 MG/ML
1 SPRAY, METERED NASAL 2 TIMES DAILY
Qty: 30 ML | Refills: 12 | Status: SHIPPED | OUTPATIENT
Start: 2025-04-07 | End: 2026-04-07

## 2025-04-07 ASSESSMENT — PATIENT HEALTH QUESTIONNAIRE - PHQ9
1. LITTLE INTEREST OR PLEASURE IN DOING THINGS: NOT AT ALL
SUM OF ALL RESPONSES TO PHQ9 QUESTIONS 1 AND 2: 0
2. FEELING DOWN, DEPRESSED OR HOPELESS: NOT AT ALL

## 2025-04-07 NOTE — PROGRESS NOTES
"Subjective   Johny White \"Foreign\" is a 65 y.o. male who presents for Follow-up (Johny White is a 65 y.o. male is here today for a follow up./).    HPI  Retired a few years ago , Summer 2022  Spending more time with his father - passed away in spring 2025     #) sinus irritation  - PND, some throat clearing and hears are popping     #) getting work up for hyperthyroidism    - work up with Dr Ham , next appt on 5.5.25   - last check April 2024, orders in for repeat     #) leg swelling - doing pretty good   - happens form time to time, looks great today     #) bilateral hip pain, pain is alternating  - more physical work   - no imaging on file  - will still monitor      #) checking on spot on the eye on the eye ball, described as a blood spot on the whites of the eye -- RESOLVED with drops   - family eye care in [place],      #) Prostate Adenocarcinoma with BPH with a varicocele - stable- q 6 month PSA and MRI yearly.   - Follow with Dr Joshua, urology -- updated MRI, to be reviewed 4/5/23-- had another biopsy 11/19/24   - months and MRI annually.  - some urinary changes      #) allergies - stable   - flonase as needed.   Quit smoking in 1999, quit drinking in 1996      #) HTN with HLD - stable , controlled   - 122/60--> 124/70 --> 134/82 --> high at 154/87 today   - on lisinorpil- HCTZ and verapamil      #) Anxiety- more guthrie with prostate stuff   - father was really ill in January 2024, this was stressing him out -- then passed in March 2025  - Retired in Summer 2022  - feels like he is doing pretty good at the moment   - sleep is reported as ok  was stuck on a ride at [employer] - harness was too ticht and had a panic attack   MRI phobia prn alprazolam with imaging      #) GERD - on PPI - STABLE   - acid seems to make it work   - EGD 2/15/19  and cscope 2/27/24-  repeat cscope every 5 years      #) Rash- topical irritants from work   - follows with [facility]   - Mohs placed for leg lesion " "    ROS was completed and all systems are negative with the exception of what was noted in the the HPI.     Objective     /87   Pulse 70   Ht 1.753 m (5' 9\")   Wt 81.2 kg (179 lb)   SpO2 95%   BMI 26.43 kg/m²      Physical Exam  GEN: A+O, no acute distress  HEENT: NC/AT, Oropharynx clear, no exudates, TM visualized, Extraoccular muscles intact, no facial droop; no thyromegaly or cervical LAD  RESP: CTAB, no wheezes   CV: RRR, no murmurs  ABD: soft, non-tender, + BS  SKIN: no rashes or bruising, no peripheral edema   NEURO: CN II-XII grossly intact, moves all extremities equally, no tremor   PSYCH: normal affect, appropriate mood     Assessment/Plan   Problem List Items Addressed This Visit    None    Blood pressure looks high today at 154/87. Goal is to keep the BP less than 130/80.   Can you monitor at home..     Weight is up a little to 179#    Continue regular eye and dental examinations.     Try the Azelastine nasal spray at [facility] for the nasal and throat symptoms     Labs on 3/26/25 were reviewed and showed LDL was slightly elevated at 103, and with other risk factors of high blood pressure , it is recommended you start on a low dose of cholesterol lowering medications ( rosuvastatin 5 mg daily at least), the ASCVD risk was elevated at 16.5%, we recommend treatment when this is over 7%). Let me know if agreeable to try. Otherwise normal electrolytes, normal liver and kidney function. Normal blood counts, and vitamin D is great at 71     Lets recheck the lipid in 6 months after getting more active, and work on a healthier diet .     EGD and cscope 2/27/24- Repeat colonoscopy in 5 years for surveillance. ( 2029)     Continue yearly follow up with Dr Jarvis for the prostate monitoring     Continue the daily exercise program.      Work on good sleep. stress test looked good in January 2019.     Follow up with Dr Ham for the results of the thyroid testing and monitoring ( 5/5/25). Get the labs done " ahead of time, orders are in     If the hip/groin pains continue to get worse, we can order xrays and PT , please message me in a few months if not better     AAA screen was done on 10/15/24- US showed No evidence of abdominal aortic aneurysm. There was mention of an Incidental elevated blood flow velocity in one of the arteries that supplies blood to the bowels, any stomach pains? We can refer to vascular or get an additional CT of the abdomen with contract to further evaluate this.  Monitor for stomach pains.     Call if you need any refills.     Continue follow up with [facility] early as recommended     Follow up in 6 months for a Medicare Wellness examination or sooner as needed        Umu Sorensen DO, MSMed, ABOM  [address]   [address]   [place] [address]  Ph. [phone]  Fx. [phone]

## 2025-04-07 NOTE — PATIENT INSTRUCTIONS
Blood pressure looks high today at 154/87. Goal is to keep the BP less than 130/80.   Can you monitor at home..     Weight is up a little to 179#    Continue regular eye and dental examinations.     Try the Azelastine nasal spray at beditme for the nasal and throat symptoms     Labs on 3/26/25 were reviewed and showed LDL was slightly elevated at 103, and with other risk factors of high blood pressure , it is recommended you start on a low dose of cholesterol lowering medications ( rosuvastatin 5 mg daily at least), the ASCVD risk was elevated at 16.5%, we recommend treatment when this is over 7%). Let me know if agreeable to try. Otherwise normal electrolytes, normal liver and kidney function. Normal blood counts, and vitamin D is great at 71     Lets recheck the lipid in 6 months after getting more active, and work on a healthier diet .     EGD and cscope 2/27/24- Repeat colonoscopy in 5 years for surveillance. ( 2029)     Continue yearly follow up with Dr Jarvis for the prostate monitoring     Continue the daily exercise program.      Work on good sleep. stress test looked good in January 2019.     Follow up with Dr Ham for the results of the thyroid testing and monitoring ( 5/5/25). Get the labs done ahead of time, orders are in     If the hip/groin pains continue to get worse, we can order xrays and PT , please message me in a few months if not better     AAA screen was done on 10/15/24- US showed No evidence of abdominal aortic aneurysm. There was mention of an Incidental elevated blood flow velocity in one of the arteries that supplies blood to the bowels, any stomach pains? We can refer to vascular or get an additional CT of the abdomen with contract to further evaluate this.  Monitor for stomach pains.     Call if you need any refills.     Continue follow up with [facility] early as recommended     Follow up in 6 months for a Medicare Wellness examination or sooner as needed

## 2025-04-21 DIAGNOSIS — I10 BENIGN ESSENTIAL HYPERTENSION: ICD-10-CM

## 2025-04-21 RX ORDER — VERAPAMIL HYDROCHLORIDE 120 MG/1
120 CAPSULE, EXTENDED RELEASE ORAL NIGHTLY
Qty: 90 CAPSULE | Refills: 3 | Status: SHIPPED | OUTPATIENT
Start: 2025-04-21 | End: 2025-04-22

## 2025-04-22 RX ORDER — VERAPAMIL HYDROCHLORIDE 120 MG/1
120 CAPSULE, EXTENDED RELEASE ORAL NIGHTLY
Qty: 90 CAPSULE | Refills: 3 | Status: SHIPPED | OUTPATIENT
Start: 2025-04-22

## 2025-04-24 LAB
T4 FREE SERPL-MCNC: 1.5 NG/DL (ref 0.8–1.8)
TSH SERPL-ACNC: 0.62 MIU/L (ref 0.4–4.5)

## 2025-05-05 ENCOUNTER — APPOINTMENT (OUTPATIENT)
Dept: ENDOCRINOLOGY | Facility: CLINIC | Age: 65
End: 2025-05-05
Payer: MEDICARE

## 2025-05-05 VITALS
HEART RATE: 77 BPM | DIASTOLIC BLOOD PRESSURE: 75 MMHG | BODY MASS INDEX: 26.58 KG/M2 | SYSTOLIC BLOOD PRESSURE: 144 MMHG | WEIGHT: 180 LBS

## 2025-05-05 DIAGNOSIS — E05.90 HYPERTHYROIDISM, SUBCLINICAL: Primary | ICD-10-CM

## 2025-05-05 PROCEDURE — 1160F RVW MEDS BY RX/DR IN RCRD: CPT | Performed by: INTERNAL MEDICINE

## 2025-05-05 PROCEDURE — 3078F DIAST BP <80 MM HG: CPT | Performed by: INTERNAL MEDICINE

## 2025-05-05 PROCEDURE — 1159F MED LIST DOCD IN RCRD: CPT | Performed by: INTERNAL MEDICINE

## 2025-05-05 PROCEDURE — 3077F SYST BP >= 140 MM HG: CPT | Performed by: INTERNAL MEDICINE

## 2025-05-05 PROCEDURE — 99214 OFFICE O/P EST MOD 30 MIN: CPT | Performed by: INTERNAL MEDICINE

## 2025-05-05 PROCEDURE — 1036F TOBACCO NON-USER: CPT | Performed by: INTERNAL MEDICINE

## 2025-05-05 ASSESSMENT — ENCOUNTER SYMPTOMS
COUGH: 0
SORE THROAT: 0
ABDOMINAL PAIN: 0
OCCASIONAL FEELINGS OF UNSTEADINESS: 0
SHORTNESS OF BREATH: 0
DIARRHEA: 0
FREQUENCY: 0
CONSTIPATION: 0
ARTHRALGIAS: 0
POLYDIPSIA: 0
FEVER: 0
NAUSEA: 0
VOMITING: 0
APPETITE CHANGE: 0
HEADACHES: 0
NERVOUS/ANXIOUS: 0

## 2025-05-05 NOTE — LETTER
"May 5, 2025     Umu Sorensen DO  [address]  [address]  [facility] [address]    Patient: Foreign White   YOB: 1960   Date of Visit: 5/5/2025       Dear Dr. Umu Sorensen DO:    Thank you for referring Foreign White to me for evaluation. Below are my notes for this consultation.  If you have questions, please do not hesitate to call me. I look forward to following your patient along with you.       Sincerely,     Vladimir Ham MD      CC: No Recipients  ______________________________________________________________________________________    History Of Present Illness  Johny White \"Dione" is a 65 y.o. male     History of subclinical thyrotoxicosis     Recent prostate biopsy      Past Medical History  He has a past medical history of Asthma, BPH (benign prostatic hyperplasia), Colon polyp, Diverticulosis, Elevated PSA, GERD (gastroesophageal reflux disease) (5/9/2012), Hyperlipidemia, Hypertension, Personal history of other diseases of the digestive system, Personal history of other endocrine, nutritional and metabolic disease (06/24/2022), Prostate cancer (Multi), and Skin disorder.    Surgical History  He has a past surgical history that includes Tonsillectomy; Esophagogastroduodenoscopy; Circumcision, primary (1960); Colonoscopy (2024); and Prostate biopsy (2023).     Social History  He reports that he quit smoking about 26 years ago. His smoking use included cigarettes. He started smoking about 53 years ago. He has a 40.1 pack-year smoking history. He has never used smokeless tobacco. He reports that he does not currently use alcohol. He reports that he does not use drugs.    Family History  Family History[1]    Allergies  Amoxicillin; Latex, natural rubber; and Oxycodone-acetaminophen    Medications  Current Outpatient Medications   Medication Instructions   • aspirin (Adult Low Dose Aspirin) 81 mg EC tablet 1 tablet, Daily   • azelastine (Astelin) 137 mcg (0.1 %) nasal spray 1 spray, Each " Nostril, 2 times daily, Use in each nostril as directed   • cholecalciferol (Vitamin D3) 5,000 Units tablet Daily   • cyanocobalamin, vitamin B-12, (Vitamin B-12) 1,000 mcg tablet extended release Take by mouth.   • diazePAM (VALIUM) 5 mg, oral, Every 6 hours PRN   • ibuprofen 200 mg, Every 6 hours PRN   • lisinopriL-hydrochlorothiazide 20-12.5 mg tablet 1 tablet, oral, 2 times daily   • omeprazole (PRILOSEC) 40 mg, oral, Daily before breakfast   • verapamil ER (VERALAN PM) 120 mg, oral, Nightly, Take at bedtime.       Review of Systems   Constitutional:  Negative for appetite change and fever.   HENT:  Negative for sore throat.         Denies dry mouth   Eyes:  Negative for visual disturbance.   Respiratory:  Negative for cough and shortness of breath.    Cardiovascular:  Negative for chest pain.   Gastrointestinal:  Negative for abdominal pain, constipation, diarrhea, nausea and vomiting.   Endocrine: Negative for polydipsia and polyuria.   Genitourinary:  Negative for frequency.   Musculoskeletal:  Negative for arthralgias.   Skin:  Negative for rash.   Neurological:  Negative for headaches.   Psychiatric/Behavioral:  The patient is not nervous/anxious.          Last Recorded Vitals  Blood pressure 144/75, pulse 77, weight 81.6 kg (180 lb).    Physical Exam  Constitutional:       General: He is not in acute distress.  HENT:      Head: Normocephalic.      Mouth/Throat:      Mouth: Mucous membranes are moist.   Eyes:      Extraocular Movements: Extraocular movements intact.   Neck:      Thyroid: No thyroid mass or thyromegaly.   Cardiovascular:      Pulses:           Radial pulses are 2+ on the right side and 2+ on the left side.   Musculoskeletal:      Right lower leg: No edema.      Left lower leg: No edema.   Lymphadenopathy:      Cervical: No cervical adenopathy.   Neurological:      Mental Status: He is alert.      Motor: No tremor.   Psychiatric:         Mood and Affect: Affect normal.          Relevant  Results  Lab Results   Component Value Date    TSH 0.62 04/24/2025    FREET4 1.5 04/24/2025    T3FREE 3.7 09/22/2022    RECE <1.10 09/22/2022       IMPRESSION  SUBCLINICAL THYROTOXICOSIS, RESOLVED  TSH normalized  No goiter      RECOMMENDATIONS  I will return you to Dr. Sorensen's care  Recommend TSH annually  I would be glad to see you in follow up, if needed.         [1]  Family History  Problem Relation Name Age of Onset   • Other (cardiac disorder) Mother Alicia    • Diabetes Mother Alicia    • Hyperlipidemia Mother Alicia    • Hypertension Mother Alicia    • Kidney disease Mother Alicia    • Skin cancer Father     • Sleep apnea Father          [1]  Family History  Problem Relation Name Age of Onset   • Other (cardiac disorder) Mother Alicia    • Diabetes Mother Alicia    • Hyperlipidemia Mother Alicia    • Hypertension Mother Alicia    • Kidney disease Mother Alicia    • Skin cancer Father     • Sleep apnea Father

## 2025-05-05 NOTE — PROGRESS NOTES
"History Of Present Illness  Johny White \"Foreign\" is a 65 y.o. male     History of subclinical thyrotoxicosis     Recent prostate biopsy      Past Medical History  He has a past medical history of Asthma, BPH (benign prostatic hyperplasia), Colon polyp, Diverticulosis, Elevated PSA, GERD (gastroesophageal reflux disease) (5/9/2012), Hyperlipidemia, Hypertension, Personal history of other diseases of the digestive system, Personal history of other endocrine, nutritional and metabolic disease (06/24/2022), Prostate cancer (Multi), and Skin disorder.    Surgical History  He has a past surgical history that includes Tonsillectomy; Esophagogastroduodenoscopy; Circumcision, primary (1959); Colonoscopy (2024); and Prostate biopsy (2023).     Social History  He reports that he quit smoking about 26 years ago. His smoking use included cigarettes. He started smoking about 53 years ago. He has a 40.1 pack-year smoking history. He has never used smokeless tobacco. He reports that he does not currently use alcohol. He reports that he does not use drugs.    Family History  Family History[1]    Allergies  Amoxicillin; Latex, natural rubber; and Oxycodone-acetaminophen    Medications  Current Outpatient Medications   Medication Instructions    aspirin (Adult Low Dose Aspirin) 81 mg EC tablet 1 tablet, Daily    azelastine (Astelin) 137 mcg (0.1 %) nasal spray 1 spray, Each Nostril, 2 times daily, Use in each nostril as directed    cholecalciferol (Vitamin D3) 5,000 Units tablet Daily    cyanocobalamin, vitamin B-12, (Vitamin B-12) 1,000 mcg tablet extended release Take by mouth.    diazePAM (VALIUM) 5 mg, oral, Every 6 hours PRN    ibuprofen 200 mg, Every 6 hours PRN    lisinopriL-hydrochlorothiazide 20-12.5 mg tablet 1 tablet, oral, 2 times daily    omeprazole (PRILOSEC) 40 mg, oral, Daily before breakfast    verapamil ER (VERALAN PM) 120 mg, oral, Nightly, Take at bedtime.       Review of Systems   Constitutional:  Negative for " appetite change and fever.   HENT:  Negative for sore throat.         Denies dry mouth   Eyes:  Negative for visual disturbance.   Respiratory:  Negative for cough and shortness of breath.    Cardiovascular:  Negative for chest pain.   Gastrointestinal:  Negative for abdominal pain, constipation, diarrhea, nausea and vomiting.   Endocrine: Negative for polydipsia and polyuria.   Genitourinary:  Negative for frequency.   Musculoskeletal:  Negative for arthralgias.   Skin:  Negative for rash.   Neurological:  Negative for headaches.   Psychiatric/Behavioral:  The patient is not nervous/anxious.          Last Recorded Vitals  Blood pressure 144/75, pulse 77, weight 81.6 kg (180 lb).    Physical Exam  Constitutional:       General: He is not in acute distress.  HENT:      Head: Normocephalic.      Mouth/Throat:      Mouth: Mucous membranes are moist.   Eyes:      Extraocular Movements: Extraocular movements intact.   Neck:      Thyroid: No thyroid mass or thyromegaly.   Cardiovascular:      Pulses:           Radial pulses are 2+ on the right side and 2+ on the left side.   Musculoskeletal:      Right lower leg: No edema.      Left lower leg: No edema.   Lymphadenopathy:      Cervical: No cervical adenopathy.   Neurological:      Mental Status: He is alert.      Motor: No tremor.   Psychiatric:         Mood and Affect: Affect normal.          Relevant Results  Lab Results   Component Value Date    TSH 0.62 04/24/2025    FREET4 1.5 04/24/2025    T3FREE 3.7 09/22/2022    RECE <1.10 09/22/2022       IMPRESSION  SUBCLINICAL THYROTOXICOSIS, RESOLVED  TSH normalized  No goiter      RECOMMENDATIONS  I will return you to Dr. Sorensen's care  Recommend TSH annually  I would be glad to see you in follow up, if needed.         [1]   Family History  Problem Relation Name Age of Onset    Other (cardiac disorder) Mother Alicia     Diabetes Mother Alicia     Hyperlipidemia Mother Alicia     Hypertension Mother Alicia     Kidney disease  Mother Alicia     Skin cancer Father      Sleep apnea Father

## 2025-05-05 NOTE — PATIENT INSTRUCTIONS
RECOMMENDATIONS  I will return you to Dr. Sorensen's care  Recommend TSH annually  I would be glad to see you in follow up, if needed.

## 2025-05-23 LAB — PSA SERPL-MCNC: 6.92 NG/ML

## 2025-07-13 DIAGNOSIS — I10 ESSENTIAL (PRIMARY) HYPERTENSION: ICD-10-CM

## 2025-07-14 RX ORDER — LISINOPRIL AND HYDROCHLOROTHIAZIDE 12.5; 2 MG/1; MG/1
1 TABLET ORAL 2 TIMES DAILY
Qty: 180 TABLET | Refills: 3 | Status: SHIPPED | OUTPATIENT
Start: 2025-07-14

## 2025-10-14 ENCOUNTER — APPOINTMENT (OUTPATIENT)
Dept: PRIMARY CARE | Facility: CLINIC | Age: 65
End: 2025-10-14
Payer: MEDICARE

## (undated) DEVICE — 20GA X 15CM, CHIBA-STYLE NEEDLE

## (undated) DEVICE — NEEDLE, BIOPSY, MAX CORE, 18G

## (undated) DEVICE — SOLUTION, IRRIGATION, X RX SODIUM CHL 0.9%, 1000ML BTL

## (undated) DEVICE — GLOVE, SURGICAL, PROTEXIS PI , 7.5, PF, LF

## (undated) DEVICE — PERINEOLOGIC PRECISIONPOINT TRANSPERINEAL ACCESS, BIOPSY NEEDLE GUIDE (P1001)

## (undated) DEVICE — DRESSING, TELFA, 3X4

## (undated) DEVICE — GOWN, SURGICAL, SIRUS, NON REINFORCED, LARGE

## (undated) DEVICE — APPLICATOR, CHLORAPREP, W/ORANGE TINT, 26ML

## (undated) DEVICE — Device

## (undated) DEVICE — BLANKET, LOWER BODY, VHA PLUS, ADULT

## (undated) DEVICE — SYRINGE, TOOMEY, IRRIGATION, 60ML, INDIVIDUAL WRAP, STERILE